# Patient Record
Sex: MALE | Race: WHITE | NOT HISPANIC OR LATINO | URBAN - METROPOLITAN AREA
[De-identification: names, ages, dates, MRNs, and addresses within clinical notes are randomized per-mention and may not be internally consistent; named-entity substitution may affect disease eponyms.]

---

## 2020-01-14 ENCOUNTER — OUTPATIENT (OUTPATIENT)
Dept: OUTPATIENT SERVICES | Facility: HOSPITAL | Age: 60
LOS: 1 days | Discharge: HOME | End: 2020-01-14

## 2020-01-14 ENCOUNTER — TRANSCRIPTION ENCOUNTER (OUTPATIENT)
Age: 60
End: 2020-01-14

## 2020-01-14 VITALS
HEIGHT: 65 IN | HEART RATE: 68 BPM | RESPIRATION RATE: 18 BRPM | TEMPERATURE: 98 F | SYSTOLIC BLOOD PRESSURE: 128 MMHG | DIASTOLIC BLOOD PRESSURE: 76 MMHG | WEIGHT: 153 LBS

## 2020-01-14 VITALS — DIASTOLIC BLOOD PRESSURE: 74 MMHG | RESPIRATION RATE: 18 BRPM | SYSTOLIC BLOOD PRESSURE: 110 MMHG | HEART RATE: 68 BPM

## 2020-01-14 DIAGNOSIS — I72.6 ANEURYSM OF VERTEBRAL ARTERY: Chronic | ICD-10-CM

## 2020-01-14 NOTE — ASU DISCHARGE PLAN (ADULT/PEDIATRIC) - FOLLOW UP APPOINTMENTS
911 or go to the nearest Emergency Room [facility]:  Endoscopy/Ambulatory Surgery [place]...

## 2020-01-14 NOTE — CHART NOTE - NSCHARTNOTEFT_GEN_A_CORE
PACU ANESTHESIA ADMISSION NOTE      Procedure:   Post op diagnosis:      ____  Intubated  TV:______       Rate: ______      FiO2: ______    __x__  Patent Airway    _x___  Full return of protective reflexes    _x___  Full recovery from anesthesia / back to baseline     Vitals:   T:           R:  12                BP:   103/73               Sat:   98                P:   76    Mental Status:  _x___ Awake   ___x__ Alert   _____ Drowsy   _____ Sedated    Nausea/Vomiting:  _x___ NO  ______Yes,   See Post - Op Orders          Pain Scale (0-10):  __x___    Treatment: ____ None    ____ See Post - Op/PCA Orders    Post - Operative Fluids:   __x__ Oral   ____ See Post - Op Orders    Plan: Discharge:   __x__Home       _____Floor     _____Critical Care    _____  Other:_________________    Comments: tolerated procedure well

## 2020-01-16 DIAGNOSIS — K80.70 CALCULUS OF GALLBLADDER AND BILE DUCT WITHOUT CHOLECYSTITIS WITHOUT OBSTRUCTION: ICD-10-CM

## 2020-01-16 DIAGNOSIS — J45.909 UNSPECIFIED ASTHMA, UNCOMPLICATED: ICD-10-CM

## 2020-01-16 DIAGNOSIS — K29.80 DUODENITIS WITHOUT BLEEDING: ICD-10-CM

## 2020-01-16 DIAGNOSIS — Z79.82 LONG TERM (CURRENT) USE OF ASPIRIN: ICD-10-CM

## 2020-01-16 DIAGNOSIS — I10 ESSENTIAL (PRIMARY) HYPERTENSION: ICD-10-CM

## 2020-01-16 DIAGNOSIS — Z91.013 ALLERGY TO SEAFOOD: ICD-10-CM

## 2022-08-19 PROBLEM — J45.909 UNSPECIFIED ASTHMA, UNCOMPLICATED: Chronic | Status: ACTIVE | Noted: 2020-01-14

## 2022-08-19 PROBLEM — I10 ESSENTIAL (PRIMARY) HYPERTENSION: Chronic | Status: ACTIVE | Noted: 2020-01-14

## 2022-08-19 PROBLEM — I63.9 CEREBRAL INFARCTION, UNSPECIFIED: Chronic | Status: ACTIVE | Noted: 2020-01-14

## 2022-12-14 ENCOUNTER — APPOINTMENT (OUTPATIENT)
Dept: CARDIOLOGY | Facility: CLINIC | Age: 62
End: 2022-12-14

## 2022-12-14 VITALS — BODY MASS INDEX: 25.51 KG/M2 | HEIGHT: 65 IN | WEIGHT: 153.13 LBS

## 2022-12-14 PROCEDURE — 99204 OFFICE O/P NEW MOD 45 MIN: CPT

## 2022-12-14 NOTE — HISTORY OF PRESENT ILLNESS
[FreeTextEntry1] : Pt with HTN, HLD, RIGHT SIDED WEAKNESS FOR 3 MONTHS  AFTER VERTEBRAL ARTERY DISSECTION 1994 S/P COVERED STENT, PREDIABETES, ASTHMA, LOW HDL 34 RANGE. \par \par 6/22: a1C: 5.9 HDL 34 \par \par pt had covid in september, pt was in [place] and says his bloodwork was better, pt has not had bloodwork since. pt not exercising as had covid. \par pt advised to start exercising but has not been \par d/w pt again about prediabetes and asthma \par pt with some sob ? due to covid

## 2022-12-14 NOTE — PHYSICAL EXAM
[Normal Venous Pressure] : normal venous pressure [Normal S1, S2] : normal S1, S2 [Clear Lung Fields] : clear lung fields [Soft] : abdomen soft [No Edema] : no edema [No Rash] : no rash [de-identified] : RRR

## 2022-12-14 NOTE — DISCUSSION/SUMMARY
[FreeTextEntry1] : diet control dm \par repeat bloodwork \par sob unlikely ischemic but risk as south  rfs \par get echo \par f/u in 4 months

## 2023-03-03 ENCOUNTER — APPOINTMENT (OUTPATIENT)
Dept: CARDIOLOGY | Facility: CLINIC | Age: 63
End: 2023-03-03

## 2023-03-21 ENCOUNTER — APPOINTMENT (OUTPATIENT)
Dept: CARDIOLOGY | Facility: CLINIC | Age: 63
End: 2023-03-21
Payer: COMMERCIAL

## 2023-03-21 VITALS — TEMPERATURE: 98.3 F | HEIGHT: 65 IN | BODY MASS INDEX: 24.54 KG/M2 | WEIGHT: 147.31 LBS

## 2023-03-21 DIAGNOSIS — R07.9 CHEST PAIN, UNSPECIFIED: ICD-10-CM

## 2023-03-21 PROCEDURE — 99214 OFFICE O/P EST MOD 30 MIN: CPT

## 2023-03-21 NOTE — PHYSICAL EXAM
[Normal Venous Pressure] : normal venous pressure [Normal S1, S2] : normal S1, S2 [Clear Lung Fields] : clear lung fields [Soft] : abdomen soft [No Edema] : no edema [No Rash] : no rash [de-identified] : RRR

## 2023-03-21 NOTE — HISTORY OF PRESENT ILLNESS
[FreeTextEntry1] : Pt with HTN, HLD, RIGHT SIDED WEAKNESS FOR 3 MONTHS  AFTER VERTEBRAL ARTERY DISSECTION 1994 S/P COVERED STENT, PREDIABETES, ASTHMA, LOW HDL 34 RANGE. \par \par 6/22: a1C: 5.9 HDL 34 \par \par pt had covid in september, pt was in [place] and says his bloodwork was better, pt has not had bloodwork since. pt not exercising as had covid. \par pt advised to start exercising but has not been \par d/w pt again about prediabetes and asthma \par pt with some sob ? due to covid \par \par 3/21/23: \par pt had a syncopal episode after using an inhaler and was out for 15 seconds. pt woke and not foggy and clear, saw pmd and sent for testing. \par echo outside center: normal lvef 55% \par carotid less than 50% b/l ica \par pt has had no prior syncopal episodes. PT SYMPTOMS WITH DISSECTION WAS FALLING TO RIGHT SIDE NO LOSS OF CONSCIOUSNESS. \par bloodwork normal \par A1c: 5.9 \par LDL 76, HDL 43.

## 2023-03-21 NOTE — DISCUSSION/SUMMARY
[FreeTextEntry1] : diet control dm \par repeat bloodwork \par sob unlikely ischemic but risk as south  rfs \par get echo \par f/u in 4 months \par \par 3/21/23\par pt syncopal etiology unknown, ? arrhythmic \par get carotid with vertebrals as not well visualized in last study, if still unable to visualize on u/s will get carotid neck. \par predm diet control.

## 2023-03-31 ENCOUNTER — APPOINTMENT (OUTPATIENT)
Dept: CARDIOLOGY | Facility: CLINIC | Age: 63
End: 2023-03-31
Payer: COMMERCIAL

## 2023-03-31 PROCEDURE — 93880 EXTRACRANIAL BILAT STUDY: CPT

## 2023-07-21 ENCOUNTER — APPOINTMENT (OUTPATIENT)
Dept: CARDIOLOGY | Facility: CLINIC | Age: 63
End: 2023-07-21

## 2023-08-29 ENCOUNTER — APPOINTMENT (OUTPATIENT)
Dept: CARDIOLOGY | Facility: CLINIC | Age: 63
End: 2023-08-29
Payer: COMMERCIAL

## 2023-08-29 VITALS — HEIGHT: 65 IN | BODY MASS INDEX: 24.57 KG/M2 | WEIGHT: 147.5 LBS

## 2023-08-29 VITALS — SYSTOLIC BLOOD PRESSURE: 130 MMHG | DIASTOLIC BLOOD PRESSURE: 80 MMHG | HEART RATE: 63 BPM

## 2023-08-29 DIAGNOSIS — Z00.00 ENCOUNTER FOR GENERAL ADULT MEDICAL EXAMINATION W/OUT ABNORMAL FINDINGS: ICD-10-CM

## 2023-08-29 PROCEDURE — 93000 ELECTROCARDIOGRAM COMPLETE: CPT

## 2023-08-29 PROCEDURE — 99214 OFFICE O/P EST MOD 30 MIN: CPT | Mod: 25

## 2023-08-29 RX ORDER — METOPROLOL SUCCINATE 25 MG/1
25 TABLET, EXTENDED RELEASE ORAL
Refills: 0 | Status: ACTIVE | COMMUNITY

## 2023-08-29 NOTE — HISTORY OF PRESENT ILLNESS
[FreeTextEntry1] : Pt with HTN, HLD, RIGHT SIDED WEAKNESS FOR 3 MONTHS  AFTER VERTEBRAL ARTERY DISSECTION  S/P COVERED STENT, PREDIABETES, ASTHMA, LOW HDL 34 RANGE, SYNCOPE IN .   : a1C: 5.9 HDL 34   3/21/23:  pt had a syncopal episode after using an inhaler and was out for 15 seconds. pt woke and not foggy and clear, saw pmd and sent for testing.  echo outside center: normal lvef 55%  carotid less than 50% b/l ica  pt has had no prior syncopal episodes. PT SYMPTOMS WITH DISSECTION WAS FALLING TO RIGHT SIDE NO LOSS OF CONSCIOUSNESS.  bloodwork normal  A1c: 5.9  LDL 76, HDL 43.   23: A1C: 5.9, T, HDL: 41, LDL: 74, CR: 1.3 3/31/23: B/L CAROTIDS LESS THAN 50% B/L ICA, vertebral arteries b/l antegrade flow. Patient c/o left sided head pain hx of vertebral artery dissection. Patient denies Patient denies cp, sob, dizziness, syncope, or palpitations. Patient does not exercise says he walks a few blocks and does not feel sob. Patient was in [place] from [place]- and did not have any issues.

## 2023-08-29 NOTE — PHYSICAL EXAM
[Normal Venous Pressure] : normal venous pressure [Normal S1, S2] : normal S1, S2 [Clear Lung Fields] : clear lung fields [Soft] : abdomen soft [No Edema] : no edema [No Rash] : no rash [de-identified] : RRR

## 2023-08-29 NOTE — DISCUSSION/SUMMARY
[FreeTextEntry1] : 8/29/23  pt syncopal etiology unknown, ? arrhythmic  get CTA head and neck carotid with vertebrals as not well visualized in last study, if still unable to visualize on u/s will get carotid neck.  predm diet control. Get 2d echo  get blood work  continue atorvastatin continue metoprolol  [EKG obtained to assist in diagnosis and management of assessed problem(s)] : EKG obtained to assist in diagnosis and management of assessed problem(s)

## 2023-09-23 ENCOUNTER — INPATIENT (INPATIENT)
Facility: HOSPITAL | Age: 63
LOS: 3 days | Discharge: HOME | DRG: 419 | End: 2023-09-27
Attending: SURGERY | Admitting: STUDENT IN AN ORGANIZED HEALTH CARE EDUCATION/TRAINING PROGRAM
Payer: COMMERCIAL

## 2023-09-23 VITALS
WEIGHT: 149.91 LBS | HEART RATE: 75 BPM | DIASTOLIC BLOOD PRESSURE: 94 MMHG | RESPIRATION RATE: 18 BRPM | OXYGEN SATURATION: 99 % | TEMPERATURE: 98 F | SYSTOLIC BLOOD PRESSURE: 166 MMHG | HEIGHT: 65 IN

## 2023-09-23 DIAGNOSIS — R33.9 RETENTION OF URINE, UNSPECIFIED: ICD-10-CM

## 2023-09-23 DIAGNOSIS — Z86.73 PERSONAL HISTORY OF TRANSIENT ISCHEMIC ATTACK (TIA), AND CEREBRAL INFARCTION WITHOUT RESIDUAL DEFICITS: ICD-10-CM

## 2023-09-23 DIAGNOSIS — E78.5 HYPERLIPIDEMIA, UNSPECIFIED: ICD-10-CM

## 2023-09-23 DIAGNOSIS — I10 ESSENTIAL (PRIMARY) HYPERTENSION: ICD-10-CM

## 2023-09-23 DIAGNOSIS — E87.5 HYPERKALEMIA: ICD-10-CM

## 2023-09-23 DIAGNOSIS — I72.6 ANEURYSM OF VERTEBRAL ARTERY: Chronic | ICD-10-CM

## 2023-09-23 DIAGNOSIS — Z79.02 LONG TERM (CURRENT) USE OF ANTITHROMBOTICS/ANTIPLATELETS: ICD-10-CM

## 2023-09-23 DIAGNOSIS — K80.42 CALCULUS OF BILE DUCT WITH ACUTE CHOLECYSTITIS WITHOUT OBSTRUCTION: ICD-10-CM

## 2023-09-23 DIAGNOSIS — Z91.013 ALLERGY TO SEAFOOD: ICD-10-CM

## 2023-09-23 DIAGNOSIS — Z79.82 LONG TERM (CURRENT) USE OF ASPIRIN: ICD-10-CM

## 2023-09-23 DIAGNOSIS — J45.909 UNSPECIFIED ASTHMA, UNCOMPLICATED: ICD-10-CM

## 2023-09-23 DIAGNOSIS — Q27.8 OTHER SPECIFIED CONGENITAL MALFORMATIONS OF PERIPHERAL VASCULAR SYSTEM: ICD-10-CM

## 2023-09-23 LAB
BASOPHILS # BLD AUTO: 0.01 K/UL — SIGNIFICANT CHANGE UP (ref 0–0.2)
BASOPHILS NFR BLD AUTO: 0.1 % — SIGNIFICANT CHANGE UP (ref 0–1)
EOSINOPHIL # BLD AUTO: 0.12 K/UL — SIGNIFICANT CHANGE UP (ref 0–0.7)
EOSINOPHIL NFR BLD AUTO: 1.4 % — SIGNIFICANT CHANGE UP (ref 0–8)
HCT VFR BLD CALC: 43.7 % — SIGNIFICANT CHANGE UP (ref 42–52)
HGB BLD-MCNC: 14.8 G/DL — SIGNIFICANT CHANGE UP (ref 14–18)
IMM GRANULOCYTES NFR BLD AUTO: 0.2 % — SIGNIFICANT CHANGE UP (ref 0.1–0.3)
LACTATE SERPL-SCNC: 1.8 MMOL/L — SIGNIFICANT CHANGE UP (ref 0.7–2)
LYMPHOCYTES # BLD AUTO: 1.32 K/UL — SIGNIFICANT CHANGE UP (ref 1.2–3.4)
LYMPHOCYTES # BLD AUTO: 15.1 % — LOW (ref 20.5–51.1)
MCHC RBC-ENTMCNC: 29.5 PG — SIGNIFICANT CHANGE UP (ref 27–31)
MCHC RBC-ENTMCNC: 33.9 G/DL — SIGNIFICANT CHANGE UP (ref 32–37)
MCV RBC AUTO: 87.2 FL — SIGNIFICANT CHANGE UP (ref 80–94)
MONOCYTES # BLD AUTO: 0.6 K/UL — SIGNIFICANT CHANGE UP (ref 0.1–0.6)
MONOCYTES NFR BLD AUTO: 6.8 % — SIGNIFICANT CHANGE UP (ref 1.7–9.3)
NEUTROPHILS # BLD AUTO: 6.69 K/UL — HIGH (ref 1.4–6.5)
NEUTROPHILS NFR BLD AUTO: 76.4 % — HIGH (ref 42.2–75.2)
NRBC # BLD: 0 /100 WBCS — SIGNIFICANT CHANGE UP (ref 0–0)
PLATELET # BLD AUTO: 286 K/UL — SIGNIFICANT CHANGE UP (ref 130–400)
PMV BLD: 9 FL — SIGNIFICANT CHANGE UP (ref 7.4–10.4)
RBC # BLD: 5.01 M/UL — SIGNIFICANT CHANGE UP (ref 4.7–6.1)
RBC # FLD: 12.5 % — SIGNIFICANT CHANGE UP (ref 11.5–14.5)
WBC # BLD: 8.76 K/UL — SIGNIFICANT CHANGE UP (ref 4.8–10.8)
WBC # FLD AUTO: 8.76 K/UL — SIGNIFICANT CHANGE UP (ref 4.8–10.8)

## 2023-09-23 PROCEDURE — 99285 EMERGENCY DEPT VISIT HI MDM: CPT

## 2023-09-23 RX ORDER — FAMOTIDINE 10 MG/ML
20 INJECTION INTRAVENOUS ONCE
Refills: 0 | Status: COMPLETED | OUTPATIENT
Start: 2023-09-23 | End: 2023-09-23

## 2023-09-23 RX ORDER — SODIUM CHLORIDE 9 MG/ML
1000 INJECTION INTRAMUSCULAR; INTRAVENOUS; SUBCUTANEOUS ONCE
Refills: 0 | Status: COMPLETED | OUTPATIENT
Start: 2023-09-23 | End: 2023-09-23

## 2023-09-23 RX ORDER — ONDANSETRON 8 MG/1
4 TABLET, FILM COATED ORAL ONCE
Refills: 0 | Status: COMPLETED | OUTPATIENT
Start: 2023-09-23 | End: 2023-09-23

## 2023-09-23 RX ADMIN — ONDANSETRON 4 MILLIGRAM(S): 8 TABLET, FILM COATED ORAL at 23:29

## 2023-09-23 RX ADMIN — FAMOTIDINE 20 MILLIGRAM(S): 10 INJECTION INTRAVENOUS at 23:30

## 2023-09-23 RX ADMIN — SODIUM CHLORIDE 1000 MILLILITER(S): 9 INJECTION INTRAMUSCULAR; INTRAVENOUS; SUBCUTANEOUS at 23:34

## 2023-09-23 NOTE — ED ADULT TRIAGE NOTE - CHIEF COMPLAINT QUOTE
PT c/o generalized abd pain, states burning pain.  pt c/o nausea and gas,  denies vomiting or diarrhea

## 2023-09-24 DIAGNOSIS — R10.9 UNSPECIFIED ABDOMINAL PAIN: ICD-10-CM

## 2023-09-24 LAB
ALBUMIN SERPL ELPH-MCNC: 5.1 G/DL — SIGNIFICANT CHANGE UP (ref 3.5–5.2)
ALBUMIN SERPL ELPH-MCNC: 5.2 G/DL — SIGNIFICANT CHANGE UP (ref 3.5–5.2)
ALP SERPL-CCNC: 76 U/L — SIGNIFICANT CHANGE UP (ref 30–115)
ALP SERPL-CCNC: 87 U/L — SIGNIFICANT CHANGE UP (ref 30–115)
ALT FLD-CCNC: 218 U/L — HIGH (ref 0–41)
ALT FLD-CCNC: 289 U/L — HIGH (ref 0–41)
ANION GAP SERPL CALC-SCNC: 11 MMOL/L — SIGNIFICANT CHANGE UP (ref 7–14)
ANION GAP SERPL CALC-SCNC: 14 MMOL/L — SIGNIFICANT CHANGE UP (ref 7–14)
APPEARANCE UR: CLEAR — SIGNIFICANT CHANGE UP
AST SERPL-CCNC: 225 U/L — HIGH (ref 0–41)
AST SERPL-CCNC: 312 U/L — HIGH (ref 0–41)
BACTERIA # UR AUTO: NEGATIVE /HPF — SIGNIFICANT CHANGE UP
BASOPHILS # BLD AUTO: 0 K/UL — SIGNIFICANT CHANGE UP (ref 0–0.2)
BASOPHILS NFR BLD AUTO: 0 % — SIGNIFICANT CHANGE UP (ref 0–1)
BILIRUB SERPL-MCNC: 0.6 MG/DL — SIGNIFICANT CHANGE UP (ref 0.2–1.2)
BILIRUB SERPL-MCNC: 1.1 MG/DL — SIGNIFICANT CHANGE UP (ref 0.2–1.2)
BILIRUB UR-MCNC: NEGATIVE — SIGNIFICANT CHANGE UP
BUN SERPL-MCNC: 13 MG/DL — SIGNIFICANT CHANGE UP (ref 10–20)
BUN SERPL-MCNC: 14 MG/DL — SIGNIFICANT CHANGE UP (ref 10–20)
CALCIUM SERPL-MCNC: 9.4 MG/DL — SIGNIFICANT CHANGE UP (ref 8.4–10.5)
CALCIUM SERPL-MCNC: 9.9 MG/DL — SIGNIFICANT CHANGE UP (ref 8.4–10.5)
CAST: 0 /LPF — SIGNIFICANT CHANGE UP (ref 0–4)
CHLORIDE SERPL-SCNC: 100 MMOL/L — SIGNIFICANT CHANGE UP (ref 98–110)
CHLORIDE SERPL-SCNC: 96 MMOL/L — LOW (ref 98–110)
CO2 SERPL-SCNC: 26 MMOL/L — SIGNIFICANT CHANGE UP (ref 17–32)
CO2 SERPL-SCNC: 27 MMOL/L — SIGNIFICANT CHANGE UP (ref 17–32)
COLOR SPEC: YELLOW — SIGNIFICANT CHANGE UP
CREAT SERPL-MCNC: 1.1 MG/DL — SIGNIFICANT CHANGE UP (ref 0.7–1.5)
CREAT SERPL-MCNC: 1.1 MG/DL — SIGNIFICANT CHANGE UP (ref 0.7–1.5)
DIFF PNL FLD: ABNORMAL
EGFR: 75 ML/MIN/1.73M2 — SIGNIFICANT CHANGE UP
EGFR: 75 ML/MIN/1.73M2 — SIGNIFICANT CHANGE UP
EOSINOPHIL # BLD AUTO: 0 K/UL — SIGNIFICANT CHANGE UP (ref 0–0.7)
EOSINOPHIL NFR BLD AUTO: 0 % — SIGNIFICANT CHANGE UP (ref 0–8)
GLUCOSE SERPL-MCNC: 149 MG/DL — HIGH (ref 70–99)
GLUCOSE SERPL-MCNC: 211 MG/DL — HIGH (ref 70–99)
GLUCOSE UR QL: NEGATIVE MG/DL — SIGNIFICANT CHANGE UP
HCT VFR BLD CALC: 48.4 % — SIGNIFICANT CHANGE UP (ref 42–52)
HGB BLD-MCNC: 16 G/DL — SIGNIFICANT CHANGE UP (ref 14–18)
IMM GRANULOCYTES NFR BLD AUTO: 0.3 % — SIGNIFICANT CHANGE UP (ref 0.1–0.3)
KETONES UR-MCNC: NEGATIVE MG/DL — SIGNIFICANT CHANGE UP
LEUKOCYTE ESTERASE UR-ACNC: NEGATIVE — SIGNIFICANT CHANGE UP
LIDOCAIN IGE QN: 79 U/L — HIGH (ref 7–60)
LYMPHOCYTES # BLD AUTO: 0.96 K/UL — LOW (ref 1.2–3.4)
LYMPHOCYTES # BLD AUTO: 15.5 % — LOW (ref 20.5–51.1)
MAGNESIUM SERPL-MCNC: 2.4 MG/DL — SIGNIFICANT CHANGE UP (ref 1.8–2.4)
MCHC RBC-ENTMCNC: 29.7 PG — SIGNIFICANT CHANGE UP (ref 27–31)
MCHC RBC-ENTMCNC: 33.1 G/DL — SIGNIFICANT CHANGE UP (ref 32–37)
MCV RBC AUTO: 89.8 FL — SIGNIFICANT CHANGE UP (ref 80–94)
MONOCYTES # BLD AUTO: 0.05 K/UL — LOW (ref 0.1–0.6)
MONOCYTES NFR BLD AUTO: 0.8 % — LOW (ref 1.7–9.3)
NEUTROPHILS # BLD AUTO: 5.15 K/UL — SIGNIFICANT CHANGE UP (ref 1.4–6.5)
NEUTROPHILS NFR BLD AUTO: 83.4 % — HIGH (ref 42.2–75.2)
NITRITE UR-MCNC: NEGATIVE — SIGNIFICANT CHANGE UP
NRBC # BLD: 0 /100 WBCS — SIGNIFICANT CHANGE UP (ref 0–0)
PH UR: 8 — SIGNIFICANT CHANGE UP (ref 5–8)
PLATELET # BLD AUTO: 307 K/UL — SIGNIFICANT CHANGE UP (ref 130–400)
PMV BLD: 9 FL — SIGNIFICANT CHANGE UP (ref 7.4–10.4)
POTASSIUM SERPL-MCNC: 4.9 MMOL/L — SIGNIFICANT CHANGE UP (ref 3.5–5)
POTASSIUM SERPL-MCNC: 5.5 MMOL/L — HIGH (ref 3.5–5)
POTASSIUM SERPL-SCNC: 4.9 MMOL/L — SIGNIFICANT CHANGE UP (ref 3.5–5)
POTASSIUM SERPL-SCNC: 5.5 MMOL/L — HIGH (ref 3.5–5)
PROT SERPL-MCNC: 8 G/DL — SIGNIFICANT CHANGE UP (ref 6–8)
PROT SERPL-MCNC: 8.1 G/DL — HIGH (ref 6–8)
PROT UR-MCNC: NEGATIVE MG/DL — SIGNIFICANT CHANGE UP
RBC # BLD: 5.39 M/UL — SIGNIFICANT CHANGE UP (ref 4.7–6.1)
RBC # FLD: 12.5 % — SIGNIFICANT CHANGE UP (ref 11.5–14.5)
RBC CASTS # UR COMP ASSIST: 5 /HPF — HIGH (ref 0–4)
SODIUM SERPL-SCNC: 134 MMOL/L — LOW (ref 135–146)
SODIUM SERPL-SCNC: 140 MMOL/L — SIGNIFICANT CHANGE UP (ref 135–146)
SP GR SPEC: 1 — SIGNIFICANT CHANGE UP (ref 1–1.03)
SQUAMOUS # UR AUTO: 0 /HPF — SIGNIFICANT CHANGE UP (ref 0–5)
TROPONIN T SERPL-MCNC: <0.01 NG/ML — SIGNIFICANT CHANGE UP
UROBILINOGEN FLD QL: 0.2 MG/DL — SIGNIFICANT CHANGE UP (ref 0.2–1)
WBC # BLD: 6.18 K/UL — SIGNIFICANT CHANGE UP (ref 4.8–10.8)
WBC # FLD AUTO: 6.18 K/UL — SIGNIFICANT CHANGE UP (ref 4.8–10.8)
WBC UR QL: 0 /HPF — SIGNIFICANT CHANGE UP (ref 0–5)

## 2023-09-24 PROCEDURE — 86803 HEPATITIS C AB TEST: CPT

## 2023-09-24 PROCEDURE — 85730 THROMBOPLASTIN TIME PARTIAL: CPT

## 2023-09-24 PROCEDURE — 74174 CTA ABD&PLVS W/CONTRAST: CPT | Mod: 26,MA

## 2023-09-24 PROCEDURE — 88304 TISSUE EXAM BY PATHOLOGIST: CPT

## 2023-09-24 PROCEDURE — 86850 RBC ANTIBODY SCREEN: CPT

## 2023-09-24 PROCEDURE — 76705 ECHO EXAM OF ABDOMEN: CPT | Mod: 26

## 2023-09-24 PROCEDURE — 74018 RADEX ABDOMEN 1 VIEW: CPT

## 2023-09-24 PROCEDURE — 80076 HEPATIC FUNCTION PANEL: CPT

## 2023-09-24 PROCEDURE — 80053 COMPREHEN METABOLIC PANEL: CPT

## 2023-09-24 PROCEDURE — 85610 PROTHROMBIN TIME: CPT

## 2023-09-24 PROCEDURE — 86901 BLOOD TYPING SEROLOGIC RH(D): CPT

## 2023-09-24 PROCEDURE — 86900 BLOOD TYPING SEROLOGIC ABO: CPT

## 2023-09-24 PROCEDURE — 83735 ASSAY OF MAGNESIUM: CPT

## 2023-09-24 PROCEDURE — 99223 1ST HOSP IP/OBS HIGH 75: CPT

## 2023-09-24 PROCEDURE — 71045 X-RAY EXAM CHEST 1 VIEW: CPT | Mod: 26

## 2023-09-24 PROCEDURE — 85025 COMPLETE CBC W/AUTO DIFF WBC: CPT

## 2023-09-24 PROCEDURE — 85027 COMPLETE CBC AUTOMATED: CPT

## 2023-09-24 PROCEDURE — 36415 COLL VENOUS BLD VENIPUNCTURE: CPT

## 2023-09-24 PROCEDURE — 71275 CT ANGIOGRAPHY CHEST: CPT | Mod: 26,MA

## 2023-09-24 PROCEDURE — S2900: CPT

## 2023-09-24 PROCEDURE — 93010 ELECTROCARDIOGRAM REPORT: CPT

## 2023-09-24 PROCEDURE — 80048 BASIC METABOLIC PNL TOTAL CA: CPT

## 2023-09-24 PROCEDURE — C1773: CPT

## 2023-09-24 PROCEDURE — C1889: CPT

## 2023-09-24 PROCEDURE — 70450 CT HEAD/BRAIN W/O DYE: CPT | Mod: 26,MA

## 2023-09-24 PROCEDURE — C1769: CPT

## 2023-09-24 PROCEDURE — 84100 ASSAY OF PHOSPHORUS: CPT

## 2023-09-24 RX ORDER — METOPROLOL TARTRATE 50 MG
25 TABLET ORAL DAILY
Refills: 0 | Status: DISCONTINUED | OUTPATIENT
Start: 2023-09-24 | End: 2023-09-26

## 2023-09-24 RX ORDER — ONDANSETRON 8 MG/1
4 TABLET, FILM COATED ORAL EVERY 8 HOURS
Refills: 0 | Status: DISCONTINUED | OUTPATIENT
Start: 2023-09-24 | End: 2023-09-26

## 2023-09-24 RX ORDER — SENNA PLUS 8.6 MG/1
2 TABLET ORAL AT BEDTIME
Refills: 0 | Status: DISCONTINUED | OUTPATIENT
Start: 2023-09-24 | End: 2023-09-26

## 2023-09-24 RX ORDER — FLUTICASONE FUROATE AND VILANTEROL TRIFENATATE 100; 25 UG/1; UG/1
1 POWDER RESPIRATORY (INHALATION)
Refills: 0 | DISCHARGE

## 2023-09-24 RX ORDER — ASPIRIN/CALCIUM CARB/MAGNESIUM 324 MG
1 TABLET ORAL
Qty: 0 | Refills: 0 | DISCHARGE

## 2023-09-24 RX ORDER — ENOXAPARIN SODIUM 100 MG/ML
40 INJECTION SUBCUTANEOUS EVERY 24 HOURS
Refills: 0 | Status: DISCONTINUED | OUTPATIENT
Start: 2023-09-24 | End: 2023-09-25

## 2023-09-24 RX ORDER — ATORVASTATIN CALCIUM 80 MG/1
10 TABLET, FILM COATED ORAL AT BEDTIME
Refills: 0 | Status: DISCONTINUED | OUTPATIENT
Start: 2023-09-24 | End: 2023-09-26

## 2023-09-24 RX ORDER — MORPHINE SULFATE 50 MG/1
2 CAPSULE, EXTENDED RELEASE ORAL EVERY 6 HOURS
Refills: 0 | Status: DISCONTINUED | OUTPATIENT
Start: 2023-09-24 | End: 2023-09-26

## 2023-09-24 RX ORDER — DIPHENHYDRAMINE HCL 50 MG
25 CAPSULE ORAL ONCE
Refills: 0 | Status: COMPLETED | OUTPATIENT
Start: 2023-09-24 | End: 2023-09-24

## 2023-09-24 RX ORDER — METOPROLOL TARTRATE 50 MG
1 TABLET ORAL
Qty: 0 | Refills: 0 | DISCHARGE

## 2023-09-24 RX ORDER — SODIUM ZIRCONIUM CYCLOSILICATE 10 G/10G
5 POWDER, FOR SUSPENSION ORAL ONCE
Refills: 0 | Status: COMPLETED | OUTPATIENT
Start: 2023-09-24 | End: 2023-09-24

## 2023-09-24 RX ORDER — ALBUTEROL 90 UG/1
2 AEROSOL, METERED ORAL
Qty: 0 | Refills: 0 | DISCHARGE

## 2023-09-24 RX ORDER — LANOLIN ALCOHOL/MO/W.PET/CERES
3 CREAM (GRAM) TOPICAL AT BEDTIME
Refills: 0 | Status: DISCONTINUED | OUTPATIENT
Start: 2023-09-24 | End: 2023-09-26

## 2023-09-24 RX ORDER — POLYETHYLENE GLYCOL 3350 17 G/17G
17 POWDER, FOR SOLUTION ORAL DAILY
Refills: 0 | Status: DISCONTINUED | OUTPATIENT
Start: 2023-09-24 | End: 2023-09-26

## 2023-09-24 RX ORDER — METOPROLOL TARTRATE 50 MG
1 TABLET ORAL
Refills: 0 | DISCHARGE

## 2023-09-24 RX ORDER — ASPIRIN/CALCIUM CARB/MAGNESIUM 324 MG
81 TABLET ORAL DAILY
Refills: 0 | Status: DISCONTINUED | OUTPATIENT
Start: 2023-09-24 | End: 2023-09-26

## 2023-09-24 RX ORDER — ATORVASTATIN CALCIUM 80 MG/1
1 TABLET, FILM COATED ORAL
Qty: 0 | Refills: 0 | DISCHARGE

## 2023-09-24 RX ORDER — ALBUTEROL 90 UG/1
2 AEROSOL, METERED ORAL EVERY 6 HOURS
Refills: 0 | Status: DISCONTINUED | OUTPATIENT
Start: 2023-09-24 | End: 2023-09-26

## 2023-09-24 RX ORDER — MORPHINE SULFATE 50 MG/1
4 CAPSULE, EXTENDED RELEASE ORAL ONCE
Refills: 0 | Status: DISCONTINUED | OUTPATIENT
Start: 2023-09-24 | End: 2023-09-24

## 2023-09-24 RX ORDER — INFLUENZA VIRUS VACCINE 15; 15; 15; 15 UG/.5ML; UG/.5ML; UG/.5ML; UG/.5ML
0.5 SUSPENSION INTRAMUSCULAR ONCE
Refills: 0 | Status: DISCONTINUED | OUTPATIENT
Start: 2023-09-24 | End: 2023-09-27

## 2023-09-24 RX ORDER — IPRATROPIUM/ALBUTEROL SULFATE 18-103MCG
3 AEROSOL WITH ADAPTER (GRAM) INHALATION EVERY 6 HOURS
Refills: 0 | Status: DISCONTINUED | OUTPATIENT
Start: 2023-09-24 | End: 2023-09-26

## 2023-09-24 RX ADMIN — Medication 25 MILLIGRAM(S): at 01:00

## 2023-09-24 RX ADMIN — Medication 125 MILLIGRAM(S): at 01:00

## 2023-09-24 RX ADMIN — SENNA PLUS 2 TABLET(S): 8.6 TABLET ORAL at 21:02

## 2023-09-24 RX ADMIN — POLYETHYLENE GLYCOL 3350 17 GRAM(S): 17 POWDER, FOR SOLUTION ORAL at 12:10

## 2023-09-24 RX ADMIN — SODIUM ZIRCONIUM CYCLOSILICATE 5 GRAM(S): 10 POWDER, FOR SUSPENSION ORAL at 17:25

## 2023-09-24 RX ADMIN — Medication 81 MILLIGRAM(S): at 12:05

## 2023-09-24 RX ADMIN — Medication 25 MILLIGRAM(S): at 12:05

## 2023-09-24 RX ADMIN — ENOXAPARIN SODIUM 40 MILLIGRAM(S): 100 INJECTION SUBCUTANEOUS at 21:02

## 2023-09-24 RX ADMIN — ATORVASTATIN CALCIUM 10 MILLIGRAM(S): 80 TABLET, FILM COATED ORAL at 21:01

## 2023-09-24 NOTE — ED PROVIDER NOTE - DIFFERENTIAL DIAGNOSIS
Differential Diagnosis Pancreatitis, hepatic failure, obstructive uropathy, diverticulitis, colitis, abscess, bowel obstruction, bowel perforation. Electrolyte abnormalities, MARY, and GI bleeding.  r/o ICH.

## 2023-09-24 NOTE — H&P ADULT - NSHPPHYSICALEXAM_GEN_ALL_CORE
T(C): 36.3 (09-24-23 @ 08:42), Max: 36.7 (09-23-23 @ 22:14)  HR: 60 (09-24-23 @ 08:42) (58 - 75)  BP: 124/78 (09-24-23 @ 08:42) (124/78 - 166/94)  RR: 18 (09-24-23 @ 08:42) (18 - 18)  SpO2: 96% (09-24-23 @ 08:42) (96% - 99%)    CONSTITUTIONAL: NAD, laying in bed comfortably  HEENT: AT, NC. neck supple, no JVD  RESP: No respiratory distress, no use of accessory muscles; CTAB  CV: RRR, +S1S2, no peripheral edema  GI: Soft, NT, ND, no rebound, no guarding; negative murphys  MSK: Normal and equal muscle strength/tone in b/l UE and LE  SKIN: No rashes or ulcers noted  NEURO: Sensation intact in upper and lower extremities b/l to light touch   PSYCH: AAOx3

## 2023-09-24 NOTE — ED PROVIDER NOTE - PHYSICAL EXAMINATION
CONST: Well appearing in NAD  EYES: PERRL, EOMI, Sclera and conjunctiva clear.   ENT: Oropharynx normal appearing, no erythema or exudates. Uvula midline.  CARD: Normal S1 S2; Normal rate and rhythm  RESP: Equal BS B/L, No wheezes, rhonchi or rales. No distress  GI: RUQ TTP palpation. No rebound or guarding. Abd soft, non distended.   SKIN: Warm, dry, no acute rashes. Good turgor  NEURO: A&Ox3, No focal deficits. Strength 5/5 with no sensory deficits.

## 2023-09-24 NOTE — H&P ADULT - ATTENDING COMMENTS
Patient was seen independently. Latest vital signs and labs  & imaging, EKG etc were reviewed. Case was discussed with house staff. Agree with resident's assessment and plan with following additions/modifications.     Patient denies any headache, any vision complaints, runny nose, fever, chills, sore throat. Denies chest pain,, palpitation. Denies , abdominal pain, diarrhoea, Denies urinary burning, urgency, frequency, dysuria. At least 10 systems were reviewed in ROS. All systems reviewed  are within normal limits except for the complaints as described in Subjective.         Not in acute distress  General: No pallor, no icterus, afebrile  HEENT: No JVD, no Bruit.  Heart: S1+S2 audible  Lungs: bilateral  fair air entry, no wheezing, no crepitations.  Abdomen: Soft, non-tender, not distended , no  rigidity or guarding.  CNS: Grossly intact, sensations intact.  Extremities:  No edema        ASSESSMENT & PLAN:  Pt is a 64 y/o M PMHx of HTN, asthma, hx R vertebral artery dissection presenting to the ED with abdominal pain. Admitted to medicine      Biliary colic   Choledocholithiasis  Transaminits - trend LFTS  Reports prior h/o ERCP 3 yrs ago when GI was not able to remove stone but symptoms improved  Keep NPO after midnight . On full liquid for now  Possible need for ERCp tomorrow vs MRCP if LFts and pain resolved  Advance GI consult  Hx R vertebral artery dissection- C/w home medication Aspirin/Lipitor, metoprolol  Stable Asthma- C/w home inhalers (Breo and Albuterol PRN)      Expected inpatient care exceeds 48 hrs.

## 2023-09-24 NOTE — PATIENT PROFILE ADULT - FALL HARM RISK - UNIVERSAL INTERVENTIONS
Bed in lowest position, wheels locked, appropriate side rails in place/Call bell, personal items and telephone in reach/Instruct patient to call for assistance before getting out of bed or chair/Non-slip footwear when patient is out of bed/[place] to call system/Physically safe environment - no spills, clutter or unnecessary equipment/Purposeful Proactive Rounding/Room/bathroom lighting operational, light cord in reach

## 2023-09-24 NOTE — CONSULT NOTE ADULT - SUBJECTIVE AND OBJECTIVE BOX
Gastroenterology Consultation:    Patient is a 63y old  Male who presents with a chief complaint of Abdominal pain (24 Sep 2023 08:13)        Admitted on: 09-24-23      HPI:  Pt is a 64 y/o M PMHx of HTN, asthma, hx R vertebral artery dissection presenting to the ED with abdominal pain. Pt reports yesterday he had severe sharp RUQ abdominal pain rated 9/10 in severity. Pain not associated with food intake and had an insidious onset. He preciously had minor aches on/off, but never this severe. On my examination pain 4/10, achy, and radiates from RUQ to back. Pt denies nausea, vomiting, chest pain, weakness, and numbness.    In ED vitals: /94 --> 136/81, HR 75, RR 18, Sat 98% on RA    Labs significant for: , , Lipase 79, Lactate 1.8, Trop <0.01, UA -    CT Angio Chest/Abd/Pelvis    1. Mild dilation of common bile duct up to 0.7 cm with distal CBD 0.4 cm choledocholith; correlation with LFTs suggested.    2. Cholelithiasis.    3. No evidence of acute aortic pathology.      RUQ US    Distended gallbladder with cholelithiasis. No abnormal gallbladder wall thickening. Negative reported sonographic Horn sign.  Extra hepatic biliary ducts better evaluated on recent CT.    Admitted to medicine (24 Sep 2023 08:13)        Prior EGD:    Prior Colonoscopy:      PAST MEDICAL & SURGICAL HISTORY:  Stroke      HTN (hypertension)      Asthma      Aneurysm of vertebral artery            FAMILY HISTORY:      Social History:  Tobacco:  Alcohol:  Drugs:    Home Medications:  aspirin 81 mg oral delayed release tablet: 1 tab(s) orally once a day (24 Sep 2023 08:13)  Breo Ellipta 100 mcg-25 mcg/inh inhalation powder: 1 puff(s) inhaled once a day (24 Sep 2023 08:12)  Lipitor 10 mg oral tablet: 1 tab(s) orally once a day (24 Sep 2023 08:13)  metoprolol succinate 25 mg oral tablet, extended release: 1 tab(s) orally once a day (24 Sep 2023 08:12)  ProAir HFA 90 mcg/inh inhalation aerosol: 2 puff(s) inhaled 4 times a day, As Needed (24 Sep 2023 08:13)        MEDICATIONS  (STANDING):  aspirin enteric coated 81 milliGRAM(s) Oral daily  atorvastatin 10 milliGRAM(s) Oral at bedtime  enoxaparin Injectable 40 milliGRAM(s) SubCutaneous every 24 hours  influenza   Vaccine 0.5 milliLiter(s) IntraMuscular once  metoprolol succinate ER 25 milliGRAM(s) Oral daily  polyethylene glycol 3350 17 Gram(s) Oral daily  senna 2 Tablet(s) Oral at bedtime  sodium zirconium cyclosilicate 5 Gram(s) Oral once    MEDICATIONS  (PRN):  albuterol    90 MICROgram(s) HFA Inhaler 2 Puff(s) Inhalation every 6 hours PRN Shortness of Breath and/or Wheezing  albuterol/ipratropium for Nebulization 3 milliLiter(s) Nebulizer every 6 hours PRN Shortness of Breath and/or Wheezing  aluminum hydroxide/magnesium hydroxide/simethicone Suspension 30 milliLiter(s) Oral every 4 hours PRN Dyspepsia  melatonin 3 milliGRAM(s) Oral at bedtime PRN Insomnia  morphine  - Injectable 2 milliGRAM(s) IV Push every 6 hours PRN Pain 4-10  ondansetron Injectable 4 milliGRAM(s) IV Push every 8 hours PRN Nausea and/or Vomiting      Allergies  Seafood (Urticaria)  No Known Drug Allergies      Review of Systems:   Constitutional:  No Fever, No Chills  ENT/Mouth:  No Hearing Changes,  No Difficulty Swallowing  Eyes:  No Eye Pain, No Vision Changes  Cardiovascular:  No Chest Pain, No Palpitations  Respiratory:  No Cough, No Dyspnea  Gastrointestinal:  As described in HPI  Musculoskeletal:  No Joint Swelling, No Back Pain  Skin:  No Skin Lesions, No Jaundice  Neuro:  No Syncope, No Dizziness  Heme/Lymph:  No Bruising, No Bleeding.          Physical Examination:  T(C): 36.6 (09-24-23 @ 12:24), Max: 36.7 (09-23-23 @ 22:14)  HR: 72 (09-24-23 @ 12:24) (58 - 75)  BP: 121/78 (09-24-23 @ 12:24) (121/78 - 166/94)  RR: 18 (09-24-23 @ 12:24) (18 - 18)  SpO2: 97% (09-24-23 @ 12:24) (96% - 99%)  Height (cm): 165.1 (09-23-23 @ 22:14)  Weight (kg): 68 (09-23-23 @ 22:14)    09-24-23 @ 07:01  -  09-24-23 @ 14:00  --------------------------------------------------------  IN: 240 mL / OUT: 0 mL / NET: 240 mL          GENERAL: AAOx3, no acute distress.  HEAD:  Atraumatic, Normocephalic  EYES: conjunctiva and sclera clear  NECK: Supple, no JVD or thyromegaly  CHEST/LUNG: Clear to auscultation bilaterally; No wheeze, rhonchi, or rales  HEART: Regular rate and rhythm; normal S1, S2, No murmurs.  ABDOMEN: Soft, nontender, nondistended; Bowel sounds present  NEUROLOGY: No asterixis or tremor.   SKIN: Intact, no jaundice        Data:                        16.0   6.18  )-----------( 307      ( 24 Sep 2023 11:14 )             48.4     Hgb Trend:  16.0  09-24-23 @ 11:14  14.8  09-23-23 @ 23:27        09-24    140  |  100  |  13  ----------------------------<  211<H>  5.5<H>   |  26  |  1.1    Ca    9.9      24 Sep 2023 11:14  Mg     2.4     09-24    TPro  8.1<H>  /  Alb  5.2  /  TBili  0.6  /  DBili  x   /  AST  225<H>  /  ALT  289<H>  /  AlkPhos  87  09-24    Liver panel trend:  TBili 0.6   /      /      /   AlkP 87   /   Tptn 8.1   /   Alb 5.2    /   DBili --      09-24  TBili 1.1   /      /      /   AlkP 76   /   Tptn 8.0   /   Alb 5.1    /   DBili --      09-23              Radiology:  CT Angio Abdomen and Pelvis w/ IV Cont:   ACC: [id number] EXAM:  CT ANGIO ABD PELV (W)AW IC   ORDERED BY: LUDIVINA ELIZALDE     ACC: [id number] EXAM:  CT ANGIO CHEST AORTA [facility]   ORDERED BY: LUDIVINA ELIZALDE     PROCEDURE DATE:  09/24/2023          INTERPRETATION:  CLINICAL HISTORY/REASON FOR EXAM: Abdominal pain.    TECHNIQUE: Initial contiguous axial CT images of the chest without IV   contrast obtained. Post-administration of 100 cc Omnipaque 350   intravenous contrast, CT angiographic axial images of the chest, abdomen   and pelviswere obtained. Coronal and sagittal reformats were performed.   3D (MIP) reformats obtained. 0 cc contrast discarded    COMPARISON: None.    FINDINGS:    VASCULATURE: No evidence of aortic dissection, intramural hematoma or   aneurysmal dilation. No central pulmonary embolus. Patent mesenteric   visceral arteries. Mixed plaque throughout aorta. Coronary artery   calcifications.    LUNGS, PLEURA, AIRWAYS: No lobar consolidation, mass, effusion, or   pneumothorax. No evidence of central endobronchial obstruction. No   bronchiectasis or honeycombing. No suspicious pulmonary nodule.    THORACIC NODES: No mediastinal, hilar, supraclavicular, or axillary   lymphadenopathy.    MEDIASTINUM: No pericardial effusion. Cardiomegaly. No adenopathy or mass.    HEPATOBILIARY: Mild dilation of common bile duct up to 0.7 cm with distal   CBD 0.4 cm calculus (series 11, image 112; series 8, image 299).   Cholelithiasis. Liver unremarkable.    SPLEEN: Unremarkable.    PANCREAS: Unremarkable.    ADRENAL GLANDS: Unremarkable.    KIDNEYS: Symmetric pattern of renal enhancement. No hydronephrosis   bilaterally.    ABDOMINAL NODES: No lymphadenopathy.    VISUALIZED PERITONEUM /MESENTERY/BOWEL/PELVIS: BPH. Urinary bladder   unremarkable. No bowel obstruction.No ascites. Normal appendix. No   pneumoperitoneum.    BONES/SOFT TISSUES:No acute osseous abnormality      IMPRESSION:    1. Mild dilation of common bile duct up to 0.7 cm with distal CBD 0.4 cm   choledocholith; correlation with LFTs suggested.    2. Cholelithiasis.    3. No evidence of acute aortic pathology.    --- End of Report ---            SHEN COLE MD; Attending Radiologist  This document has been electronically signed. Sep 24 2023  2:42AM (09-24-23 @ 02:19)    US Abdomen Upper Quadrant Right:   ACC: [id number] EXAM:  US ABDOMEN RT UPR QUADRANT   ORDERED BY: LUDIVINA ELIZALDE     PROCEDURE DATE:  09/24/2023          INTERPRETATION:  CLINICAL INFORMATION: Cholecystitis.    COMPARISON: CT from same day    TECHNIQUE: Sonography of the right upper quadrant.    FINDINGS:  Liver: Within normal limits.  Extra hepatic biliary ducts better evaluated on recent CT.  Distended gallbladder with cholelithiasis. No abnormal gallbladder wall   thickening. Negative reported sonographic Horn sign.  Pancreas obscured by overlying bowel gas.  Right kidney: 10.3 cm in length.. No hydronephrosis. 2.9 cm cyst  Ascites: None.  IVC: Visualized portions are within normal limits.    IMPRESSION:  Distended gallbladder with cholelithiasis. No abnormal gallbladder wall   thickening. Negative reported sonographic Horn sign.  Extra hepatic biliary ducts better evaluated on recent CT.    --- End of Report ---            SHEN COLE MD; Attending Radiologist  This document has been electronically signed. Sep24 2023  4:02AM (09-24-23 @ 04:00)

## 2023-09-24 NOTE — ED PROVIDER NOTE - NS ED ATTENDING STATEMENT MOD
This was a shared visit with the HENNA. I reviewed and verified the documentation and independently performed the documented:

## 2023-09-24 NOTE — H&P ADULT - HISTORY OF PRESENT ILLNESS
Pt is a 64 y/o M PMHx of HTN, asthma, hx R vertebral artery dissection presenting to the ED with abdominal pain. Pt reports yesterday he had severe sharp RUQ abdominal pain rated 9/10 in severity. Pain not associated with food intake and had an insidious onset. He preciously had minor aches on/off, but never this severe. On my examination pain 4/10, achy, and radiates from RUQ to back. Pt denies nausea, vomiting, chest pain, weakness, and numbness.    In ED vitals: /94 --> 136/81, HR 75, RR 18, Sat 98% on RA    Labs significant for: , , Lipase 79, Lactate 1.8, Trop <0.01, UA -    CT Angio Chest/Abd/Pelvis    RUQ US    Admitted to medicine Pt is a 64 y/o M PMHx of HTN, asthma, hx R vertebral artery dissection presenting to the ED with abdominal pain. Pt reports yesterday he had severe sharp RUQ abdominal pain rated 9/10 in severity. Pain not associated with food intake and had an insidious onset. He preciously had minor aches on/off, but never this severe. On my examination pain 4/10, achy, and radiates from RUQ to back. Pt denies nausea, vomiting, chest pain, weakness, and numbness.    In ED vitals: /94 --> 136/81, HR 75, RR 18, Sat 98% on RA    Labs significant for: , , Lipase 79, Lactate 1.8, Trop <0.01, UA -    CT Angio Chest/Abd/Pelvis    1. Mild dilation of common bile duct up to 0.7 cm with distal CBD 0.4 cm choledocholith; correlation with LFTs suggested.    2. Cholelithiasis.    3. No evidence of acute aortic pathology.      RUQ US    Distended gallbladder with cholelithiasis. No abnormal gallbladder wall thickening. Negative reported sonographic Horn sign.  Extra hepatic biliary ducts better evaluated on recent CT.    Admitted to medicine

## 2023-09-24 NOTE — ED PROVIDER NOTE - ATTENDING APP SHARED VISIT CONTRIBUTION OF CARE
Patient is c/o abd pain, associated with nasuea/ vomiting, also felt LT leg was sleepy, which had improved. Patient denies trauma. Patient is on anticoagulants.   Vitals reviewed.   Patient is awake, alert, answering questions appropriately, appears comfortable and not in any distress.  Lungs: CTA, no wheezing, no crackles.  abd: +BS, +epigastric tenderness, ND, soft,   CNS: awake, alert, o x 3, no focal neurologic deficits.  A/P: Abd pain with nausea/vomiting,   LLE symptoms had improved.   Labs, CTA, reevaluation.   CT head to r/o ICH.

## 2023-09-24 NOTE — CONSULT NOTE ADULT - ASSESSMENT
Pt is a 64 y/o M PMHx of HTN, asthma, hx R vertebral artery dissection presenting to the ED with abdominal pain.    # abdominal pian most likely 2/2 biliary cholic with cbd stone:  - hemodynamically stable  - no cholangitis  - labs reviewed  - exam reassuring  - CT/US reviewed     recommendations:  - low fat diet as tolerated today  - NPO after mid night for possible ERCP in AM as add on  - if fever or wbc start ABx  - trend LFTs  Pt is a 62 y/o M PMHx of HTN, asthma, hx R vertebral artery dissection presenting to the ED with abdominal pain.    # abdominal pian most likely 2/2 biliary colic with cbd stone:  - hemodynamically stable  - no cholangitis  - labs reviewed  - exam reassuring  - CT/US reviewed     recommendations:  - low fat diet as tolerated today  - NPO after mid night for possible ERCP in AM as add on  - if fever or wbc start ABx  - trend LFTs  Pt is a 62 y/o M PMHx of HTN, asthma, hx R vertebral artery dissection presenting to the ED with abdominal pain.    # abdominal pian most likely 2/2 biliary colic with cbd stone:  - hemodynamically stable  - no cholangitis  - labs reviewed  - exam reassuring  - CT/US reviewed     recommendations:  - low fat diet as tolerated today  - NPO after mid night for possible ERCP in AM as add on  - correct K, get INR, TYPE and screen  - d/c am lovenox  - if fever or wbc start ABx  - trend LFTs

## 2023-09-24 NOTE — ED ADULT NURSE NOTE - NSFALLUNIVINTERV_ED_ALL_ED
Bed/Stretcher in lowest position, wheels locked, appropriate side rails in place/Call bell, personal items and telephone in reach/Instruct patient to call for assistance before getting out of bed/chair/stretcher/Non-slip footwear applied when patient is off stretcher/[place] to call system/Physically safe environment - no spills, clutter or unnecessary equipment/Purposeful proactive rounding/Room/bathroom lighting operational, light cord in reach

## 2023-09-24 NOTE — ED PROVIDER NOTE - CARE PLAN
1 Principal Discharge DX:	Abdominal pain  Secondary Diagnosis:	Elevated liver function tests  Secondary Diagnosis:	Gall stone

## 2023-09-24 NOTE — ED PROVIDER NOTE - OBJECTIVE STATEMENT
62 yo male w a pmxh of verterbral artery disection on AC, HLD, gallstones presents to the ED complaining of abd pain. Patient w/ acute onset of RUQ abd pain, described as crampy, intermittent. Assocaited nausea without vomiting. No palliating or provoking factors. Denies fevers, chills, CP, SOB, diarrhea, dysuria.

## 2023-09-25 ENCOUNTER — TRANSCRIPTION ENCOUNTER (OUTPATIENT)
Age: 63
End: 2023-09-25

## 2023-09-25 LAB
ALBUMIN SERPL ELPH-MCNC: 3.9 G/DL — SIGNIFICANT CHANGE UP (ref 3.5–5.2)
ALBUMIN SERPL ELPH-MCNC: 4.6 G/DL — SIGNIFICANT CHANGE UP (ref 3.5–5.2)
ALP SERPL-CCNC: 68 U/L — SIGNIFICANT CHANGE UP (ref 30–115)
ALP SERPL-CCNC: 69 U/L — SIGNIFICANT CHANGE UP (ref 30–115)
ALT FLD-CCNC: 155 U/L — HIGH (ref 0–41)
ALT FLD-CCNC: 168 U/L — HIGH (ref 0–41)
ANION GAP SERPL CALC-SCNC: 10 MMOL/L — SIGNIFICANT CHANGE UP (ref 7–14)
ANION GAP SERPL CALC-SCNC: 7 MMOL/L — SIGNIFICANT CHANGE UP (ref 7–14)
APTT BLD: 29 SEC — SIGNIFICANT CHANGE UP (ref 27–39.2)
AST SERPL-CCNC: 104 U/L — HIGH (ref 0–41)
AST SERPL-CCNC: 95 U/L — HIGH (ref 0–41)
BASOPHILS # BLD AUTO: 0.02 K/UL — SIGNIFICANT CHANGE UP (ref 0–0.2)
BASOPHILS NFR BLD AUTO: 0.1 % — SIGNIFICANT CHANGE UP (ref 0–1)
BILIRUB SERPL-MCNC: 0.5 MG/DL — SIGNIFICANT CHANGE UP (ref 0.2–1.2)
BILIRUB SERPL-MCNC: 0.5 MG/DL — SIGNIFICANT CHANGE UP (ref 0.2–1.2)
BLD GP AB SCN SERPL QL: SIGNIFICANT CHANGE UP
BUN SERPL-MCNC: 21 MG/DL — HIGH (ref 10–20)
BUN SERPL-MCNC: 21 MG/DL — HIGH (ref 10–20)
CALCIUM SERPL-MCNC: 9.5 MG/DL — SIGNIFICANT CHANGE UP (ref 8.4–10.4)
CALCIUM SERPL-MCNC: 9.5 MG/DL — SIGNIFICANT CHANGE UP (ref 8.4–10.5)
CHLORIDE SERPL-SCNC: 100 MMOL/L — SIGNIFICANT CHANGE UP (ref 98–110)
CHLORIDE SERPL-SCNC: 101 MMOL/L — SIGNIFICANT CHANGE UP (ref 98–110)
CO2 SERPL-SCNC: 30 MMOL/L — SIGNIFICANT CHANGE UP (ref 17–32)
CO2 SERPL-SCNC: 30 MMOL/L — SIGNIFICANT CHANGE UP (ref 17–32)
CREAT SERPL-MCNC: 1.1 MG/DL — SIGNIFICANT CHANGE UP (ref 0.7–1.5)
CREAT SERPL-MCNC: 1.1 MG/DL — SIGNIFICANT CHANGE UP (ref 0.7–1.5)
CULTURE RESULTS: SIGNIFICANT CHANGE UP
EGFR: 75 ML/MIN/1.73M2 — SIGNIFICANT CHANGE UP
EGFR: 75 ML/MIN/1.73M2 — SIGNIFICANT CHANGE UP
EOSINOPHIL # BLD AUTO: 0.04 K/UL — SIGNIFICANT CHANGE UP (ref 0–0.7)
EOSINOPHIL NFR BLD AUTO: 0.3 % — SIGNIFICANT CHANGE UP (ref 0–8)
GLUCOSE SERPL-MCNC: 101 MG/DL — HIGH (ref 70–99)
GLUCOSE SERPL-MCNC: 140 MG/DL — HIGH (ref 70–99)
HCT VFR BLD CALC: 41.4 % — LOW (ref 42–52)
HCT VFR BLD CALC: 45.7 % — SIGNIFICANT CHANGE UP (ref 42–52)
HCV AB S/CO SERPL IA: 0.03 COI — SIGNIFICANT CHANGE UP
HCV AB SERPL-IMP: SIGNIFICANT CHANGE UP
HGB BLD-MCNC: 13.7 G/DL — LOW (ref 14–18)
HGB BLD-MCNC: 15.3 G/DL — SIGNIFICANT CHANGE UP (ref 14–18)
IMM GRANULOCYTES NFR BLD AUTO: 0.4 % — HIGH (ref 0.1–0.3)
INR BLD: 0.97 RATIO — SIGNIFICANT CHANGE UP (ref 0.65–1.3)
LYMPHOCYTES # BLD AUTO: 15.5 % — LOW (ref 20.5–51.1)
LYMPHOCYTES # BLD AUTO: 2.14 K/UL — SIGNIFICANT CHANGE UP (ref 1.2–3.4)
MAGNESIUM SERPL-MCNC: 2.4 MG/DL — SIGNIFICANT CHANGE UP (ref 1.8–2.4)
MAGNESIUM SERPL-MCNC: 2.4 MG/DL — SIGNIFICANT CHANGE UP (ref 1.8–2.4)
MCHC RBC-ENTMCNC: 29.5 PG — SIGNIFICANT CHANGE UP (ref 27–31)
MCHC RBC-ENTMCNC: 29.8 PG — SIGNIFICANT CHANGE UP (ref 27–31)
MCHC RBC-ENTMCNC: 33.1 G/DL — SIGNIFICANT CHANGE UP (ref 32–37)
MCHC RBC-ENTMCNC: 33.5 G/DL — SIGNIFICANT CHANGE UP (ref 32–37)
MCV RBC AUTO: 88.9 FL — SIGNIFICANT CHANGE UP (ref 80–94)
MCV RBC AUTO: 89 FL — SIGNIFICANT CHANGE UP (ref 80–94)
MONOCYTES # BLD AUTO: 1.15 K/UL — HIGH (ref 0.1–0.6)
MONOCYTES NFR BLD AUTO: 8.3 % — SIGNIFICANT CHANGE UP (ref 1.7–9.3)
NEUTROPHILS # BLD AUTO: 10.39 K/UL — HIGH (ref 1.4–6.5)
NEUTROPHILS NFR BLD AUTO: 75.4 % — HIGH (ref 42.2–75.2)
NRBC # BLD: 0 /100 WBCS — SIGNIFICANT CHANGE UP (ref 0–0)
NRBC # BLD: 0 /100 WBCS — SIGNIFICANT CHANGE UP (ref 0–0)
PHOSPHATE SERPL-MCNC: 3 MG/DL — SIGNIFICANT CHANGE UP (ref 2.1–4.9)
PLATELET # BLD AUTO: 277 K/UL — SIGNIFICANT CHANGE UP (ref 130–400)
PLATELET # BLD AUTO: 294 K/UL — SIGNIFICANT CHANGE UP (ref 130–400)
PMV BLD: 9.2 FL — SIGNIFICANT CHANGE UP (ref 7.4–10.4)
PMV BLD: 9.3 FL — SIGNIFICANT CHANGE UP (ref 7.4–10.4)
POTASSIUM SERPL-MCNC: 4.5 MMOL/L — SIGNIFICANT CHANGE UP (ref 3.5–5)
POTASSIUM SERPL-MCNC: 4.6 MMOL/L — SIGNIFICANT CHANGE UP (ref 3.5–5)
POTASSIUM SERPL-SCNC: 4.5 MMOL/L — SIGNIFICANT CHANGE UP (ref 3.5–5)
POTASSIUM SERPL-SCNC: 4.6 MMOL/L — SIGNIFICANT CHANGE UP (ref 3.5–5)
PROT SERPL-MCNC: 6.5 G/DL — SIGNIFICANT CHANGE UP (ref 6–8)
PROT SERPL-MCNC: 7.3 G/DL — SIGNIFICANT CHANGE UP (ref 6–8)
PROTHROM AB SERPL-ACNC: 11.1 SEC — SIGNIFICANT CHANGE UP (ref 9.95–12.87)
RBC # BLD: 4.65 M/UL — LOW (ref 4.7–6.1)
RBC # BLD: 5.14 M/UL — SIGNIFICANT CHANGE UP (ref 4.7–6.1)
RBC # FLD: 12.4 % — SIGNIFICANT CHANGE UP (ref 11.5–14.5)
RBC # FLD: 12.6 % — SIGNIFICANT CHANGE UP (ref 11.5–14.5)
SODIUM SERPL-SCNC: 138 MMOL/L — SIGNIFICANT CHANGE UP (ref 135–146)
SODIUM SERPL-SCNC: 140 MMOL/L — SIGNIFICANT CHANGE UP (ref 135–146)
SPECIMEN SOURCE: SIGNIFICANT CHANGE UP
WBC # BLD: 13.8 K/UL — HIGH (ref 4.8–10.8)
WBC # BLD: 9.79 K/UL — SIGNIFICANT CHANGE UP (ref 4.8–10.8)
WBC # FLD AUTO: 13.8 K/UL — HIGH (ref 4.8–10.8)
WBC # FLD AUTO: 9.79 K/UL — SIGNIFICANT CHANGE UP (ref 4.8–10.8)

## 2023-09-25 PROCEDURE — 43264 ERCP REMOVE DUCT CALCULI: CPT

## 2023-09-25 PROCEDURE — 99223 1ST HOSP IP/OBS HIGH 75: CPT

## 2023-09-25 PROCEDURE — 43262 ENDO CHOLANGIOPANCREATOGRAPH: CPT | Mod: XU

## 2023-09-25 PROCEDURE — 99232 SBSQ HOSP IP/OBS MODERATE 35: CPT

## 2023-09-25 PROCEDURE — 99223 1ST HOSP IP/OBS HIGH 75: CPT | Mod: 25

## 2023-09-25 PROCEDURE — 74328 X-RAY BILE DUCT ENDOSCOPY: CPT | Mod: 26

## 2023-09-25 RX ORDER — METRONIDAZOLE 500 MG
500 TABLET ORAL EVERY 8 HOURS
Refills: 0 | Status: DISCONTINUED | OUTPATIENT
Start: 2023-09-25 | End: 2023-09-26

## 2023-09-25 RX ORDER — SODIUM CHLORIDE 9 MG/ML
1000 INJECTION INTRAMUSCULAR; INTRAVENOUS; SUBCUTANEOUS
Refills: 0 | Status: DISCONTINUED | OUTPATIENT
Start: 2023-09-25 | End: 2023-09-26

## 2023-09-25 RX ORDER — METRONIDAZOLE 500 MG
500 TABLET ORAL ONCE
Refills: 0 | Status: COMPLETED | OUTPATIENT
Start: 2023-09-25 | End: 2023-09-25

## 2023-09-25 RX ORDER — CIPROFLOXACIN LACTATE 400MG/40ML
400 VIAL (ML) INTRAVENOUS ONCE
Refills: 0 | Status: COMPLETED | OUTPATIENT
Start: 2023-09-25 | End: 2023-09-25

## 2023-09-25 RX ORDER — CIPROFLOXACIN LACTATE 400MG/40ML
VIAL (ML) INTRAVENOUS
Refills: 0 | Status: DISCONTINUED | OUTPATIENT
Start: 2023-09-25 | End: 2023-09-26

## 2023-09-25 RX ORDER — METRONIDAZOLE 500 MG
TABLET ORAL
Refills: 0 | Status: DISCONTINUED | OUTPATIENT
Start: 2023-09-25 | End: 2023-09-26

## 2023-09-25 RX ORDER — CIPROFLOXACIN LACTATE 400MG/40ML
400 VIAL (ML) INTRAVENOUS EVERY 12 HOURS
Refills: 0 | Status: DISCONTINUED | OUTPATIENT
Start: 2023-09-25 | End: 2023-09-26

## 2023-09-25 RX ORDER — INDOMETHACIN 50 MG
100 CAPSULE ORAL ONCE
Refills: 0 | Status: COMPLETED | OUTPATIENT
Start: 2023-09-25 | End: 2023-09-25

## 2023-09-25 RX ADMIN — ATORVASTATIN CALCIUM 10 MILLIGRAM(S): 80 TABLET, FILM COATED ORAL at 21:22

## 2023-09-25 RX ADMIN — Medication 100 MILLIGRAM(S): at 16:08

## 2023-09-25 RX ADMIN — Medication 100 MILLIGRAM(S): at 21:23

## 2023-09-25 RX ADMIN — Medication 100 MILLIGRAM(S): at 11:51

## 2023-09-25 RX ADMIN — POLYETHYLENE GLYCOL 3350 17 GRAM(S): 17 POWDER, FOR SOLUTION ORAL at 11:51

## 2023-09-25 RX ADMIN — SENNA PLUS 2 TABLET(S): 8.6 TABLET ORAL at 21:23

## 2023-09-25 RX ADMIN — Medication 25 MILLIGRAM(S): at 05:26

## 2023-09-25 RX ADMIN — Medication 200 MILLIGRAM(S): at 18:44

## 2023-09-25 RX ADMIN — SODIUM CHLORIDE 100 MILLILITER(S): 9 INJECTION INTRAMUSCULAR; INTRAVENOUS; SUBCUTANEOUS at 18:44

## 2023-09-25 RX ADMIN — Medication 81 MILLIGRAM(S): at 11:51

## 2023-09-25 RX ADMIN — Medication 200 MILLIGRAM(S): at 11:50

## 2023-09-25 NOTE — CHART NOTE - NSCHARTNOTEFT_GEN_A_CORE
Patient is s/p ERCP with sphincterotomy . Biliary sludge removed.    REC:  Clear liquid diet   Watch for post -ERCP complications   Advance diet in am if uneventful   Trend LFTs

## 2023-09-25 NOTE — CONSULT NOTE ADULT - SUBJECTIVE AND OBJECTIVE BOX
GENERAL SURGERY CONSULT NOTE    Patient: ZHANNA HART , 63y ([birth year])Male   MRN: [id number]  Location: [facility] 4C (Back) 020 A  Visit: 09-24-23 Inpatient  Date: 09-25-23 @ 10:40    HPI:  Pt is a 64 y/o M PMHx of HTN, asthma, hx R vertebral artery dissection presenting to the ED with abdominal pain. Pt reports yesterday he had severe sharp RUQ abdominal pain rated 9/10 in severity. Pain not associated with food intake and had an insidious onset. He preciously had minor aches on/off, but never this severe. On my examination pain 4/10, achy, and radiates from RUQ to back. Pt denies nausea, vomiting, chest pain, weakness, and numbness.    In ED vitals: /94 --> 136/81, HR 75, RR 18, Sat 98% on RA    Labs significant for: , , Lipase 79, Lactate 1.8, Trop <0.01, UA -    CT Angio Chest/Abd/Pelvis    1. Mild dilation of common bile duct up to 0.7 cm with distal CBD 0.4 cm choledocholith; correlation with LFTs suggested.    2. Cholelithiasis.    3. No evidence of acute aortic pathology.      RUQ US    Distended gallbladder with cholelithiasis. No abnormal gallbladder wall thickening. Negative reported sonographic Horn sign.  Extra hepatic biliary ducts better evaluated on recent CT.    Admitted to medicine (24 Sep 2023 08:13)      PAST MEDICAL & SURGICAL HISTORY:  Stroke      HTN (hypertension)      Asthma      Aneurysm of vertebral artery          Home Medications:  aspirin 81 mg oral delayed release tablet: 1 tab(s) orally once a day (24 Sep 2023 08:13)  Breo Ellipta 100 mcg-25 mcg/inh inhalation powder: 1 puff(s) inhaled once a day (24 Sep 2023 08:12)  Lipitor 10 mg oral tablet: 1 tab(s) orally once a day (24 Sep 2023 08:13)  metoprolol succinate 25 mg oral tablet, extended release: 1 tab(s) orally once a day (24 Sep 2023 08:12)  ProAir HFA 90 mcg/inh inhalation aerosol: 2 puff(s) inhaled 4 times a day, As Needed (24 Sep 2023 08:13)        VITALS:  T(F): 97.7 (09-25-23 @ 08:42), Max: 97.8 (09-24-23 @ 12:24)  HR: 57 (09-25-23 @ 08:42) (57 - 72)  BP: 128/81 (09-25-23 @ 08:42) (120/64 - 144/81)  RR: 18 (09-25-23 @ 08:42) (18 - 18)  SpO2: 96% (09-25-23 @ 08:42) (96% - 97%)    PHYSICAL EXAM:  General: NAD, AAOx3, calm and cooperative  HEENT: NCAT, VALORIE, EOMI, Trachea ML, Neck supple  Cardiac: RRR S1, S2, no Murmurs, rubs or gallops  Respiratory: CTAB, normal respiratory effort, breath sounds equal BL, no wheeze, rhonchi or crackles  Abdomen: Soft, non-distended, non-tender, no rebound, no guarding. +BS.  Rectal: Good tone, +stool, no blood, no rosendo-anal masses/lesions, no fistulas, fissures, hemorrhoids  Musculoskeletal: Strength 5/5 BL UE/LE, ROM intact, compartments soft  Neuro: Sensation grossly intact and equal throughout, no focal deficits  Vascular: Pulses 2+ throughout, extremities well perfused  Skin: Warm/dry, normal color, no jaundice  Incision/wound: healing well, dressings in place, clean, dry and intact    MEDICATIONS  (STANDING):  aspirin enteric coated 81 milliGRAM(s) Oral daily  atorvastatin 10 milliGRAM(s) Oral at bedtime  enoxaparin Injectable 40 milliGRAM(s) SubCutaneous every 24 hours  influenza   Vaccine 0.5 milliLiter(s) IntraMuscular once  metoprolol succinate ER 25 milliGRAM(s) Oral daily  polyethylene glycol 3350 17 Gram(s) Oral daily  senna 2 Tablet(s) Oral at bedtime    MEDICATIONS  (PRN):  albuterol    90 MICROgram(s) HFA Inhaler 2 Puff(s) Inhalation every 6 hours PRN Shortness of Breath and/or Wheezing  albuterol/ipratropium for Nebulization 3 milliLiter(s) Nebulizer every 6 hours PRN Shortness of Breath and/or Wheezing  aluminum hydroxide/magnesium hydroxide/simethicone Suspension 30 milliLiter(s) Oral every 4 hours PRN Dyspepsia  melatonin 3 milliGRAM(s) Oral at bedtime PRN Insomnia  morphine  - Injectable 2 milliGRAM(s) IV Push every 6 hours PRN Pain 4-10  ondansetron Injectable 4 milliGRAM(s) IV Push every 8 hours PRN Nausea and/or Vomiting      LAB/STUDIES:                        13.7   13.80 )-----------( 277      ( 25 Sep 2023 06:07 )             41.4     09-25    138  |  101  |  21<H>  ----------------------------<  101<H>  4.6   |  30  |  1.1    Ca    9.5      25 Sep 2023 06:07  Mg     2.4     09-25    TPro  6.5  /  Alb  3.9  /  TBili  0.5  /  DBili  x   /  AST  104<H>  /  ALT  168<H>  /  AlkPhos  69  09-25    PT/INR - ( 25 Sep 2023 06:07 )   PT: 11.10 sec;   INR: 0.97 ratio         PTT - ( 25 Sep 2023 06:07 )  PTT:29.0 sec  LIVER FUNCTIONS - ( 25 Sep 2023 06:07 )  Alb: 3.9 g/dL / Pro: 6.5 g/dL / ALK PHOS: 69 U/L / ALT: 168 U/L / AST: 104 U/L / GGT: x           Urinalysis Basic - ( 25 Sep 2023 06:07 )    Color: x / Appearance: x / SG: x / pH: x  Gluc: 101 mg/dL / Ketone: x  / Bili: x / Urobili: x   Blood: x / Protein: x / Nitrite: x   Leuk Esterase: x / RBC: x / WBC x   Sq Epi: x / Non Sq Epi: x / Bacteria: x      CARDIAC MARKERS ( 23 Sep 2023 23:27 )  x     / <0.01 ng/mL / x     / x     / x                  IMAGING:      ACCESS DEVICES:  [ ] Peripheral IV  [ ] Central Venous Line	[ ] R	[ ] L	[ ] IJ	[ ] Fem	[ ] SC	Placed:   [ ] Arterial Line		[ ] R	[ ] L	[ ] Fem	[ ] Rad	[ ] Ax	Placed:   [ ] PICC:					[ ] Mediport  [ ] Urinary Catheter, Date Placed:      GENERAL SURGERY CONSULT NOTE    Patient: ZHANNA HART , 63y ([birth year])Male   MRN: [id number]  Location: [facility] 4C (Back) 020 A  Visit: 09-24-23 Inpatient  Date: 09-25-23 @ 10:40    HPI:  63 year old male with a medical history of HTN, asthma, history of Right vertebral artery dissection, presented to the ED complaining with abdominal pain. Patient reports pain started on 9/23, and described as sharp , 9/10 in the RUQ. Pain not associated with food intake. Patient denied with nausea, vomiting, chest pian, weakness and numbness. Work up included US shwoing distended gallbladder with cholelithiasis, CT mild dilation of common bile duct up to 0.7 cm with distal CBD 0.4 cm choledocholith and Transaminitis. Gastroenterology planing to perform a ERCP on 9/25/23 .General surgery consulted for evaluation and further treatment.       In ED vitals: /94 --> 136/81, HR 75, RR 18, Sat 98% on RA    Labs significant for: , , Lipase 79, Lactate 1.8, Trop <0.01, UA -    CT Angio Chest/Abd/Pelvis    1. Mild dilation of common bile duct up to 0.7 cm with distal CBD 0.4 cm choledocholith; correlation with LFTs suggested.    2. Cholelithiasis.    3. No evidence of acute aortic pathology.      RUQ US    Distended gallbladder with cholelithiasis. No abnormal gallbladder wall thickening. Negative reported sonographic Horn sign.  Extra hepatic biliary ducts better evaluated on recent CT.    Admitted to medicine (24 Sep 2023 08:13)      PAST MEDICAL & SURGICAL HISTORY:  Stroke      HTN (hypertension)      Asthma      Aneurysm of vertebral artery          Home Medications:  aspirin 81 mg oral delayed release tablet: 1 tab(s) orally once a day (24 Sep 2023 08:13)  Breo Ellipta 100 mcg-25 mcg/inh inhalation powder: 1 puff(s) inhaled once a day (24 Sep 2023 08:12)  Lipitor 10 mg oral tablet: 1 tab(s) orally once a day (24 Sep 2023 08:13)  metoprolol succinate 25 mg oral tablet, extended release: 1 tab(s) orally once a day (24 Sep 2023 08:12)  ProAir HFA 90 mcg/inh inhalation aerosol: 2 puff(s) inhaled 4 times a day, As Needed (24 Sep 2023 08:13)        VITALS:  T(F): 97.7 (09-25-23 @ 08:42), Max: 97.8 (09-24-23 @ 12:24)  HR: 57 (09-25-23 @ 08:42) (57 - 72)  BP: 128/81 (09-25-23 @ 08:42) (120/64 - 144/81)  RR: 18 (09-25-23 @ 08:42) (18 - 18)  SpO2: 96% (09-25-23 @ 08:42) (96% - 97%)    PHYSICAL EXAM:  General: NAD, AAOx3, calm and cooperative  HEENT: NCAT, VALORIE, EOMI, Trachea ML, Neck supple  Cardiac: RRR S1, S2, no Murmurs, rubs or gallops  Respiratory: CTAB, normal respiratory effort, breath sounds equal BL, no wheeze, rhonchi or crackles  Abdomen: Soft, non-distended, non-tender, no rebound, no guarding. +BS.  Rectal: Good tone, +stool, no blood, no rosendo-anal masses/lesions, no fistulas, fissures, hemorrhoids  Musculoskeletal: Strength 5/5 BL UE/LE, ROM intact, compartments soft  Neuro: Sensation grossly intact and equal throughout, no focal deficits  Vascular: Pulses 2+ throughout, extremities well perfused  Skin: Warm/dry, normal color, no jaundice  Incision/wound: healing well, dressings in place, clean, dry and intact    MEDICATIONS  (STANDING):  aspirin enteric coated 81 milliGRAM(s) Oral daily  atorvastatin 10 milliGRAM(s) Oral at bedtime  enoxaparin Injectable 40 milliGRAM(s) SubCutaneous every 24 hours  influenza   Vaccine 0.5 milliLiter(s) IntraMuscular once  metoprolol succinate ER 25 milliGRAM(s) Oral daily  polyethylene glycol 3350 17 Gram(s) Oral daily  senna 2 Tablet(s) Oral at bedtime    MEDICATIONS  (PRN):  albuterol    90 MICROgram(s) HFA Inhaler 2 Puff(s) Inhalation every 6 hours PRN Shortness of Breath and/or Wheezing  albuterol/ipratropium for Nebulization 3 milliLiter(s) Nebulizer every 6 hours PRN Shortness of Breath and/or Wheezing  aluminum hydroxide/magnesium hydroxide/simethicone Suspension 30 milliLiter(s) Oral every 4 hours PRN Dyspepsia  melatonin 3 milliGRAM(s) Oral at bedtime PRN Insomnia  morphine  - Injectable 2 milliGRAM(s) IV Push every 6 hours PRN Pain 4-10  ondansetron Injectable 4 milliGRAM(s) IV Push every 8 hours PRN Nausea and/or Vomiting      LAB/STUDIES:                        13.7   13.80 )-----------( 277      ( 25 Sep 2023 06:07 )             41.4     09-25    138  |  101  |  21<H>  ----------------------------<  101<H>  4.6   |  30  |  1.1    Ca    9.5      25 Sep 2023 06:07  Mg     2.4     09-25    TPro  6.5  /  Alb  3.9  /  TBili  0.5  /  DBili  x   /  AST  104<H>  /  ALT  168<H>  /  AlkPhos  69  09-25    PT/INR - ( 25 Sep 2023 06:07 )   PT: 11.10 sec;   INR: 0.97 ratio         PTT - ( 25 Sep 2023 06:07 )  PTT:29.0 sec  LIVER FUNCTIONS - ( 25 Sep 2023 06:07 )  Alb: 3.9 g/dL / Pro: 6.5 g/dL / ALK PHOS: 69 U/L / ALT: 168 U/L / AST: 104 U/L / GGT: x           Urinalysis Basic - ( 25 Sep 2023 06:07 )    Color: x / Appearance: x / SG: x / pH: x  Gluc: 101 mg/dL / Ketone: x  / Bili: x / Urobili: x   Blood: x / Protein: x / Nitrite: x   Leuk Esterase: x / RBC: x / WBC x   Sq Epi: x / Non Sq Epi: x / Bacteria: x      CARDIAC MARKERS ( 23 Sep 2023 23:27 )  x     / <0.01 ng/mL / x     / x     / x                  IMAGING:    Labs significant for: , , Lipase 79, Lactate 1.8, Trop <0.01, UA -    CT Angio Chest/Abd/Pelvis    1. Mild dilation of common bile duct up to 0.7 cm with distal CBD 0.4 cm choledocholith; correlation with LFTs suggested.    2. Cholelithiasis.    3. No evidence of acute aortic pathology.      RUQ US    Distended gallbladder with cholelithiasis. No abnormal gallbladder wall thickening. Negative reported sonographic Horn sign.  Extra hepatic biliary ducts better evaluated on recent CT.    ACCESS DEVICES:  [ ] Peripheral IV  [ ] Central Venous Line	[ ] R	[ ] L	[ ] IJ	[ ] Fem	[ ] SC	Placed:   [ ] Arterial Line		[ ] R	[ ] L	[ ] Fem	[ ] Rad	[ ] Ax	Placed:   [ ] PICC:					[ ] Mediport  [ ] Urinary Catheter, Date Placed:

## 2023-09-25 NOTE — CONSULT NOTE ADULT - ATTENDING COMMENTS
Patient seen   likely choledocho lithiasis  lfts improving    will schedule lap jammie after ERCP finding.

## 2023-09-25 NOTE — CONSULT NOTE ADULT - ASSESSMENT
ASSESSMENT:  63yM w/ PMHx of  ***  who presented with ***. Physical exam findings, imaging, and labs as documented above.     PLAN:  -NPO at midnight  -Add on case for Lap cholecystectomy on 9/26/2023  with Dr. Navarrete  -    Lines/Tubes: PIV, Midline, Central Line, A-Line, Chest tubes, Andrew/MERYL drains, Chairez Catheter.    Above plan discussed with Attending Surgeon  ***  , patient, patient family, and Primary team  09-25-23 @ 10:40   ASSESSMENT:  63yM w/ PMHx of  ***  who presented with ***. Physical exam findings, imaging, and labs as documented above.   PENDING FULL PLAN  PLAN:  -NPO at midnight  -Add on case for Lap cholecystectomy on 9/26/2023  with Dr. Navarrete  -    Lines/Tubes: PIV, Midline, Central Line, A-Line, Chest tubes, Andrew/MERYL drains, Chairez Catheter.    Above plan discussed with Attending Surgeon  ***  , patient, patient family, and Primary team  09-25-23 @ 10:40   ASSESSMENT:  63 year old male with a medical history of HTN, asthma, history of Right vertebral artery dissection, presented to the ED complaining with abdominal pain. Patient reports pain started on 9/23, and described as sharp , 9/10 in the RUQ. Pain not associated with food intake. Patient denied with nausea, vomiting, chest pian, weakness and numbness. Work up included US shwoing distended gallbladder with cholelithiasis, CT mild dilation of common bile duct up to 0.7 cm with distal CBD 0.4 cm choledocholith and Transaminitis. Gastroenterology planing to perform a ERCP on 9/25/23 .  Laparoscopic cholecystectomy planned. Risks, benefit and complications of procedure discussed including, but not limited to: pain, bleeding, infection, injury to nearby structures. Patient verbalized and whishes to proceed.     PLAN:  -NPO at midnight  -Add on case for Lap cholecystectomy, possible open on 9/26/2023  with Dr. Navarrete  -Rest per primary     Above plan discussed with Attending Surgeon Dr. Navarrete  , patient, patient family, and Primary team  09-25-23 @ 10:40   ASSESSMENT:  63 year old male with a medical history of HTN, asthma, history of Right vertebral artery dissection, presented to the ED complaining with abdominal pain. Patient reports pain started on 9/23, and described as sharp , 9/10 in the RUQ. Pain not associated with food intake. Patient denied with nausea, vomiting, chest pian, weakness and numbness. Work up included US shwoing distended gallbladder with cholelithiasis, CT mild dilation of common bile duct up to 0.7 cm with distal CBD 0.4 cm choledocholith and Transaminitis. Gastroenterology planing to perform a ERCP on 9/25/23 .  Laparoscopic cholecystectomy planned. Risks, benefit and complications of procedure discussed including, but not limited to: pain, bleeding, infection, injury to nearby structures. Patient verbalized and whishes to proceed.     PLAN:  -NPO at midnight  -Add on case for Lap cholecystectomy, possible open on 9/26/2023  with Dr. Navarrete  - Please make sure patient is NPO @ [facility] tonight and please send preop labs which include CBC, BMP, Mag, Phosp, type and screen, coagulation studies, EKG, CXR.    -Rest per primary     Above plan discussed with Attending Surgeon Dr. Navarrete  , patient, patient family, and Primary team  09-25-23 @ 10:40   ASSESSMENT:  63 year old male with a medical history of HTN, asthma, history of Right vertebral artery dissection, presented to the ED complaining with abdominal pain. Patient reports pain started on 9/23, and described as sharp , 9/10 in the RUQ. Pain not associated with food intake. Patient denied with nausea, vomiting, chest pian, weakness and numbness. Work up included US shwoing distended gallbladder with cholelithiasis, CT mild dilation of common bile duct up to 0.7 cm with distal CBD 0.4 cm choledocholith and Transaminitis. Gastroenterology planing to perform a ERCP on 9/25/23 . Likely choledocholithiasis  Laparoscopic cholecystectomy planned. Risks, benefit and complications of procedure discussed including, but not limited to: pain, bleeding, infection, injury to nearby structures. Patient verbalized and whishes to proceed.     PLAN:  -NPO at midnight  -Add on case for Lap cholecystectomy, possible open on 9/26/2023  with Dr. Navarrete  - [facility] and please send preop labs which include CBC, BMP, Mag, Phosp, type and screen, coagulation studies, EKG, CXR.    -Rest per primary     Above plan discussed with Attending Surgeon Dr. Navarrete  , patient, patient family, and Primary team  09-25-23 @ 10:40

## 2023-09-26 ENCOUNTER — TRANSCRIPTION ENCOUNTER (OUTPATIENT)
Age: 63
End: 2023-09-26

## 2023-09-26 ENCOUNTER — RESULT REVIEW (OUTPATIENT)
Age: 63
End: 2023-09-26

## 2023-09-26 PROCEDURE — 99233 SBSQ HOSP IP/OBS HIGH 50: CPT | Mod: 57

## 2023-09-26 PROCEDURE — 64488 TAP BLOCK BI INJECTION: CPT

## 2023-09-26 PROCEDURE — 88304 TISSUE EXAM BY PATHOLOGIST: CPT | Mod: 26

## 2023-09-26 PROCEDURE — 99233 SBSQ HOSP IP/OBS HIGH 50: CPT

## 2023-09-26 PROCEDURE — S2900 ROBOTIC SURGICAL SYSTEM: CPT | Mod: NC

## 2023-09-26 PROCEDURE — 99232 SBSQ HOSP IP/OBS MODERATE 35: CPT

## 2023-09-26 PROCEDURE — 47563 LAPARO CHOLECYSTECTOMY/GRAPH: CPT | Mod: 22

## 2023-09-26 RX ORDER — CIPROFLOXACIN LACTATE 400MG/40ML
400 VIAL (ML) INTRAVENOUS EVERY 12 HOURS
Refills: 0 | Status: DISCONTINUED | OUTPATIENT
Start: 2023-09-26 | End: 2023-09-27

## 2023-09-26 RX ORDER — MORPHINE SULFATE 50 MG/1
2 CAPSULE, EXTENDED RELEASE ORAL EVERY 6 HOURS
Refills: 0 | Status: DISCONTINUED | OUTPATIENT
Start: 2023-09-26 | End: 2023-09-27

## 2023-09-26 RX ORDER — IPRATROPIUM/ALBUTEROL SULFATE 18-103MCG
3 AEROSOL WITH ADAPTER (GRAM) INHALATION EVERY 6 HOURS
Refills: 0 | Status: DISCONTINUED | OUTPATIENT
Start: 2023-09-26 | End: 2023-09-27

## 2023-09-26 RX ORDER — METRONIDAZOLE 500 MG
500 TABLET ORAL EVERY 8 HOURS
Refills: 0 | Status: DISCONTINUED | OUTPATIENT
Start: 2023-09-26 | End: 2023-09-27

## 2023-09-26 RX ORDER — ONDANSETRON 8 MG/1
4 TABLET, FILM COATED ORAL EVERY 8 HOURS
Refills: 0 | Status: DISCONTINUED | OUTPATIENT
Start: 2023-09-26 | End: 2023-09-27

## 2023-09-26 RX ORDER — SODIUM CHLORIDE 9 MG/ML
1000 INJECTION, SOLUTION INTRAVENOUS
Refills: 0 | Status: DISCONTINUED | OUTPATIENT
Start: 2023-09-26 | End: 2023-09-26

## 2023-09-26 RX ORDER — LANOLIN ALCOHOL/MO/W.PET/CERES
3 CREAM (GRAM) TOPICAL AT BEDTIME
Refills: 0 | Status: DISCONTINUED | OUTPATIENT
Start: 2023-09-26 | End: 2023-09-27

## 2023-09-26 RX ORDER — ATORVASTATIN CALCIUM 80 MG/1
10 TABLET, FILM COATED ORAL AT BEDTIME
Refills: 0 | Status: DISCONTINUED | OUTPATIENT
Start: 2023-09-26 | End: 2023-09-27

## 2023-09-26 RX ORDER — ASPIRIN/CALCIUM CARB/MAGNESIUM 324 MG
81 TABLET ORAL DAILY
Refills: 0 | Status: DISCONTINUED | OUTPATIENT
Start: 2023-09-26 | End: 2023-09-27

## 2023-09-26 RX ORDER — METOPROLOL TARTRATE 50 MG
25 TABLET ORAL DAILY
Refills: 0 | Status: DISCONTINUED | OUTPATIENT
Start: 2023-09-26 | End: 2023-09-27

## 2023-09-26 RX ORDER — SODIUM CHLORIDE 9 MG/ML
1000 INJECTION INTRAMUSCULAR; INTRAVENOUS; SUBCUTANEOUS
Refills: 0 | Status: DISCONTINUED | OUTPATIENT
Start: 2023-09-26 | End: 2023-09-27

## 2023-09-26 RX ORDER — ALBUTEROL 90 UG/1
2 AEROSOL, METERED ORAL EVERY 6 HOURS
Refills: 0 | Status: DISCONTINUED | OUTPATIENT
Start: 2023-09-26 | End: 2023-09-27

## 2023-09-26 RX ORDER — MORPHINE SULFATE 50 MG/1
4 CAPSULE, EXTENDED RELEASE ORAL
Refills: 0 | Status: DISCONTINUED | OUTPATIENT
Start: 2023-09-26 | End: 2023-09-26

## 2023-09-26 RX ORDER — SENNA PLUS 8.6 MG/1
2 TABLET ORAL AT BEDTIME
Refills: 0 | Status: DISCONTINUED | OUTPATIENT
Start: 2023-09-26 | End: 2023-09-27

## 2023-09-26 RX ORDER — POLYETHYLENE GLYCOL 3350 17 G/17G
17 POWDER, FOR SOLUTION ORAL DAILY
Refills: 0 | Status: DISCONTINUED | OUTPATIENT
Start: 2023-09-26 | End: 2023-09-27

## 2023-09-26 RX ORDER — ASPIRIN/CALCIUM CARB/MAGNESIUM 324 MG
81 TABLET ORAL ONCE
Refills: 0 | Status: DISCONTINUED | OUTPATIENT
Start: 2023-09-26 | End: 2023-09-26

## 2023-09-26 RX ORDER — METOPROLOL TARTRATE 50 MG
25 TABLET ORAL ONCE
Refills: 0 | Status: COMPLETED | OUTPATIENT
Start: 2023-09-26 | End: 2023-09-26

## 2023-09-26 RX ADMIN — SODIUM CHLORIDE 100 MILLILITER(S): 9 INJECTION INTRAMUSCULAR; INTRAVENOUS; SUBCUTANEOUS at 20:00

## 2023-09-26 RX ADMIN — Medication 200 MILLIGRAM(S): at 05:52

## 2023-09-26 RX ADMIN — Medication 25 MILLIGRAM(S): at 13:14

## 2023-09-26 RX ADMIN — ATORVASTATIN CALCIUM 10 MILLIGRAM(S): 80 TABLET, FILM COATED ORAL at 21:03

## 2023-09-26 RX ADMIN — Medication 100 MILLIGRAM(S): at 21:07

## 2023-09-26 RX ADMIN — SODIUM CHLORIDE 75 MILLILITER(S): 9 INJECTION, SOLUTION INTRAVENOUS at 18:33

## 2023-09-26 RX ADMIN — Medication 100 MILLIGRAM(S): at 05:52

## 2023-09-26 RX ADMIN — Medication 81 MILLIGRAM(S): at 13:13

## 2023-09-26 RX ADMIN — SODIUM CHLORIDE 100 MILLILITER(S): 9 INJECTION INTRAMUSCULAR; INTRAVENOUS; SUBCUTANEOUS at 05:52

## 2023-09-26 RX ADMIN — SENNA PLUS 2 TABLET(S): 8.6 TABLET ORAL at 21:02

## 2023-09-26 NOTE — BRIEF OPERATIVE NOTE - NSICDXBRIEFPROCEDURE_GEN_ALL_CORE_FT
PROCEDURES:  Robot-assisted laparoscopic cholecystectomy using indocyanine green (ICG) fluorescence imaging system and da Rc Xi 26-Sep-2023 18:34:00  Matt Little, transversus abdominis plane, bilateral 26-Sep-2023 18:47:57  Matt Little

## 2023-09-26 NOTE — CHART NOTE - NSCHARTNOTEFT_GEN_A_CORE
Post Operative Note  Patient: ZHANNA HART 63y (1960) Male   MRN: [id number]  Location: [address] (Back) 020 A  Visit: 09-24-23 Inpatient  Date: 09-26-23 @ 23:02    Procedure:   - S/P Robot-assisted laparoscopic cholecystectomy using indocyanine green (ICG) fluorescence imaging system and da Rc Xi  - Block, transversus abdominis plane, bilateral    Surgeon: Dr. Navarrete      Subjective:   Nausea: no, Vomiting: no, Ambulating: yes, Flatus: no  Pain Assessment: Patient is complaining of mild, generalized abdominal pain that is appropriate for post-operative course.     Objective:  Vitals: T(F): 96.5 (09-26-23 @ 20:51), Max: 98.5 (09-26-23 @ 15:01)  HR: 91 (09-26-23 @ 20:51)  BP: 124/71 (09-26-23 @ 20:51) (112/64 - 151/80)  RR: 18 (09-26-23 @ 20:51)  SpO2: 95% (09-26-23 @ 20:51)  Vent Settings:     In:   09-25-23 @ 07:01  -  09-26-23 @ 07:00  --------------------------------------------------------  IN: 1600 mL    09-26-23 @ 07:01  -  09-26-23 @ 23:02  --------------------------------------------------------  IN: 387.5 mL      IV Fluids: sodium chloride 0.9%. 1000 milliLiter(s) (100 mL/Hr) IV Continuous <Continuous>      Out:   09-25-23 @ 07:01  -  09-26-23 @ 07:00  --------------------------------------------------------  OUT: 0 mL    09-26-23 @ 07:01  -  09-26-23 @ 23:02  --------------------------------------------------------  OUT: 620 mL      EBL:     Voided Urine:   09-25-23 @ 07:01  -  09-26-23 @ 07:00  --------------------------------------------------------  OUT: 0 mL    09-26-23 @ 07:01  -  09-26-23 @ 23:02  --------------------------------------------------------  OUT: 620 mL      Chairez Catheter: no       Physical Examination:  General Appearance: NAD, sitting at side of bed  HEENT: EOMI, sclera non-icteric.  Heart: RRR  Lungs: CTABL  Abdomen:  Soft, mild TTP at surgical site, appropriate for post-op course, nondistended. No rigidity, guarding, or rebound tenderness.   MSK/Extremities: Warm & well-perfused. Peripheral pulses intact.  Skin: Warm, dry. No jaundice.   Incisions/Wounds: Dressings in place, clean, dry and intact, no signs of infection/active bleeding/drainage    Medications: [Standing]  albuterol    90 MICROgram(s) HFA Inhaler 2 Puff(s) Inhalation every 6 hours PRN  albuterol/ipratropium for Nebulization 3 milliLiter(s) Nebulizer every 6 hours PRN  aluminum hydroxide/magnesium hydroxide/simethicone Suspension 30 milliLiter(s) Oral every 4 hours PRN  aspirin enteric coated 81 milliGRAM(s) Oral daily  atorvastatin 10 milliGRAM(s) Oral at bedtime  ciprofloxacin   IVPB 400 milliGRAM(s) IV Intermittent every 12 hours  influenza   Vaccine 0.5 milliLiter(s) IntraMuscular once  melatonin 3 milliGRAM(s) Oral at bedtime PRN  metoprolol succinate ER 25 milliGRAM(s) Oral daily  metroNIDAZOLE  IVPB 500 milliGRAM(s) IV Intermittent every 8 hours  morphine  - Injectable 2 milliGRAM(s) IV Push every 6 hours PRN  ondansetron Injectable 4 milliGRAM(s) IV Push every 8 hours PRN  polyethylene glycol 3350 17 Gram(s) Oral daily  senna 2 Tablet(s) Oral at bedtime  sodium chloride 0.9%. 1000 milliLiter(s) IV Continuous <Continuous>    Medications: [PRN]  albuterol    90 MICROgram(s) HFA Inhaler 2 Puff(s) Inhalation every 6 hours PRN  albuterol/ipratropium for Nebulization 3 milliLiter(s) Nebulizer every 6 hours PRN  aluminum hydroxide/magnesium hydroxide/simethicone Suspension 30 milliLiter(s) Oral every 4 hours PRN  aspirin enteric coated 81 milliGRAM(s) Oral daily  atorvastatin 10 milliGRAM(s) Oral at bedtime  ciprofloxacin   IVPB 400 milliGRAM(s) IV Intermittent every 12 hours  influenza   Vaccine 0.5 milliLiter(s) IntraMuscular once  melatonin 3 milliGRAM(s) Oral at bedtime PRN  metoprolol succinate ER 25 milliGRAM(s) Oral daily  metroNIDAZOLE  IVPB 500 milliGRAM(s) IV Intermittent every 8 hours  morphine  - Injectable 2 milliGRAM(s) IV Push every 6 hours PRN  ondansetron Injectable 4 milliGRAM(s) IV Push every 8 hours PRN  polyethylene glycol 3350 17 Gram(s) Oral daily  senna 2 Tablet(s) Oral at bedtime  sodium chloride 0.9%. 1000 milliLiter(s) IV Continuous <Continuous>      ANTIBIOTICS:  ciprofloxacin   IVPB 400 milliGRAM(s) + metroNIDAZOLE  IVPB 500 milliGRAM(s)        Labs:                        15.3   9.79  )-----------( 294      ( 25 Sep 2023 21:05 )             45.7     09-25    140  |  100  |  21<H>  ----------------------------<  140<H>  4.5   |  30  |  1.1    Ca    9.5      25 Sep 2023 21:05  Phos  3.0     09-25  Mg     2.4     09-25    TPro  7.3  /  Alb  4.6  /  TBili  0.5  /  DBili  x   /  AST  95<H>  /  ALT  155<H>  /  AlkPhos  68  09-25    PT/INR - ( 25 Sep 2023 06:07 )   PT: 11.10 sec;   INR: 0.97 ratio         PTT - ( 25 Sep 2023 06:07 )  PTT:29.0 sec        Imaging:  No post-op imaging studies    ----------------------------------  63M w/ choledocholithiasis S/P robotic-assisted laparoscopic cholecystectomy.      Plan:  - Monitor vitals  - Monitor post-op labs and replete as necessary  - Monitor for bowel function  - Continue Pain Medications if necessary  - Continue IV Antibiotics --> will discharge on Augmentin BID for total 7 days    - Encourage ambulation as tolerated  - Monitor urine output  - Monitor wound and dressing for changes, redress as needed.  - Plan to discharge home tomorrow       Date/Time: 09-26-23 @ 23:02    Surgery - Blue Team  [phone]

## 2023-09-26 NOTE — CHART NOTE - NSCHARTNOTEFT_GEN_A_CORE
PACU ANESTHESIA ADMISSION NOTE      Procedure: Robot-assisted laparoscopic cholecystectomy using indocyanine green (ICG) fluorescence imaging system and da Rc Xi    Post op diagnosis:  Acute cholecystitis      __x__  Patent Airway    __x__  Full return of protective reflexes    x____  Full recovery from anesthesia / back to baseline     Vitals:   T: 97.6          R:   14              BP:        151/80          Sat:     99%              P: 79      Mental Status:  _x___ Awake   _____ Alert   _____ Drowsy   _____ Sedated    Nausea/Vomiting:  ____ NO  ______Yes,   See Post - Op Orders          Pain Scale (0-10):  _____    Treatment: ____ None    ___x_ See Post - Op/PCA Orders    Post - Operative Fluids:   ____ Oral   __x__ See Post - Op Orders    Plan: Discharge:   ___x_Home       _____Floor     _____Critical Care    _____  Other:_________________    Comments: Pt tolerated procedure well, no anesthesia related complications. Care of pt endorsed to PACU, report given to PACU RN. Discharge when criteria are met.

## 2023-09-26 NOTE — BRIEF OPERATIVE NOTE - OPERATION/FINDINGS
Acute cholecystitis with extensive omental adhesions. 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 CVS obtained, cystic duct clipped x3 and sharply divided. Some spillage of gallbladder contents, copious irrigation. Hemostasis achieved.

## 2023-09-26 NOTE — DISCHARGE NOTE NURSING/CASE MANAGEMENT/SOCIAL WORK - NSDCPEFALRISK_GEN_ALL_CORE
For information on Fall & Injury Prevention, visit: [email or web address] OR  [email or web address] OR  https://www.cdc.gov/steadi/patient.html

## 2023-09-26 NOTE — DISCHARGE NOTE NURSING/CASE MANAGEMENT/SOCIAL WORK - PATIENT PORTAL LINK FT
You can access the FollowMyHealth Patient Portal offered by [facility] by registering at the following website: [email or web address]. By joining [employer]’s FollowMyHealth portal, you will also be able to view your health information using other applications (apps) compatible with our system. [id number]

## 2023-09-27 ENCOUNTER — TRANSCRIPTION ENCOUNTER (OUTPATIENT)
Age: 63
End: 2023-09-27

## 2023-09-27 VITALS
SYSTOLIC BLOOD PRESSURE: 144 MMHG | HEART RATE: 67 BPM | DIASTOLIC BLOOD PRESSURE: 85 MMHG | RESPIRATION RATE: 18 BRPM | TEMPERATURE: 97 F | OXYGEN SATURATION: 95 %

## 2023-09-27 LAB
ALBUMIN SERPL ELPH-MCNC: 4.3 G/DL — SIGNIFICANT CHANGE UP (ref 3.5–5.2)
ALBUMIN SERPL ELPH-MCNC: 4.8 G/DL — SIGNIFICANT CHANGE UP (ref 3.5–5.2)
ALP SERPL-CCNC: 63 U/L — SIGNIFICANT CHANGE UP (ref 30–115)
ALP SERPL-CCNC: 69 U/L — SIGNIFICANT CHANGE UP (ref 30–115)
ALT FLD-CCNC: 107 U/L — HIGH (ref 0–41)
ALT FLD-CCNC: 120 U/L — HIGH (ref 0–41)
ANION GAP SERPL CALC-SCNC: 13 MMOL/L — SIGNIFICANT CHANGE UP (ref 7–14)
ANION GAP SERPL CALC-SCNC: 14 MMOL/L — SIGNIFICANT CHANGE UP (ref 7–14)
AST SERPL-CCNC: 104 U/L — HIGH (ref 0–41)
AST SERPL-CCNC: 113 U/L — HIGH (ref 0–41)
BASOPHILS # BLD AUTO: 0 K/UL — SIGNIFICANT CHANGE UP (ref 0–0.2)
BASOPHILS # BLD AUTO: 0.01 K/UL — SIGNIFICANT CHANGE UP (ref 0–0.2)
BASOPHILS NFR BLD AUTO: 0 % — SIGNIFICANT CHANGE UP (ref 0–1)
BASOPHILS NFR BLD AUTO: 0.1 % — SIGNIFICANT CHANGE UP (ref 0–1)
BILIRUB DIRECT SERPL-MCNC: 0.2 MG/DL — SIGNIFICANT CHANGE UP (ref 0–0.3)
BILIRUB INDIRECT FLD-MCNC: 0.4 MG/DL — SIGNIFICANT CHANGE UP (ref 0.2–1.2)
BILIRUB SERPL-MCNC: 0.6 MG/DL — SIGNIFICANT CHANGE UP (ref 0.2–1.2)
BILIRUB SERPL-MCNC: 0.7 MG/DL — SIGNIFICANT CHANGE UP (ref 0.2–1.2)
BUN SERPL-MCNC: 23 MG/DL — HIGH (ref 10–20)
BUN SERPL-MCNC: 23 MG/DL — HIGH (ref 10–20)
CALCIUM SERPL-MCNC: 9.1 MG/DL — SIGNIFICANT CHANGE UP (ref 8.4–10.5)
CALCIUM SERPL-MCNC: 9.7 MG/DL — SIGNIFICANT CHANGE UP (ref 8.4–10.5)
CHLORIDE SERPL-SCNC: 100 MMOL/L — SIGNIFICANT CHANGE UP (ref 98–110)
CHLORIDE SERPL-SCNC: 100 MMOL/L — SIGNIFICANT CHANGE UP (ref 98–110)
CO2 SERPL-SCNC: 22 MMOL/L — SIGNIFICANT CHANGE UP (ref 17–32)
CO2 SERPL-SCNC: 25 MMOL/L — SIGNIFICANT CHANGE UP (ref 17–32)
CREAT SERPL-MCNC: 1.1 MG/DL — SIGNIFICANT CHANGE UP (ref 0.7–1.5)
CREAT SERPL-MCNC: 1.2 MG/DL — SIGNIFICANT CHANGE UP (ref 0.7–1.5)
EGFR: 68 ML/MIN/1.73M2 — SIGNIFICANT CHANGE UP
EGFR: 75 ML/MIN/1.73M2 — SIGNIFICANT CHANGE UP
EOSINOPHIL # BLD AUTO: 0 K/UL — SIGNIFICANT CHANGE UP (ref 0–0.7)
EOSINOPHIL # BLD AUTO: 0 K/UL — SIGNIFICANT CHANGE UP (ref 0–0.7)
EOSINOPHIL NFR BLD AUTO: 0 % — SIGNIFICANT CHANGE UP (ref 0–8)
EOSINOPHIL NFR BLD AUTO: 0 % — SIGNIFICANT CHANGE UP (ref 0–8)
GLUCOSE SERPL-MCNC: 110 MG/DL — HIGH (ref 70–99)
GLUCOSE SERPL-MCNC: 125 MG/DL — HIGH (ref 70–99)
HCT VFR BLD CALC: 41.6 % — LOW (ref 42–52)
HCT VFR BLD CALC: 45.6 % — SIGNIFICANT CHANGE UP (ref 42–52)
HGB BLD-MCNC: 13.7 G/DL — LOW (ref 14–18)
HGB BLD-MCNC: 15.1 G/DL — SIGNIFICANT CHANGE UP (ref 14–18)
IMM GRANULOCYTES NFR BLD AUTO: 0.1 % — SIGNIFICANT CHANGE UP (ref 0.1–0.3)
IMM GRANULOCYTES NFR BLD AUTO: 0.2 % — SIGNIFICANT CHANGE UP (ref 0.1–0.3)
LYMPHOCYTES # BLD AUTO: 0.72 K/UL — LOW (ref 1.2–3.4)
LYMPHOCYTES # BLD AUTO: 0.99 K/UL — LOW (ref 1.2–3.4)
LYMPHOCYTES # BLD AUTO: 11.1 % — LOW (ref 20.5–51.1)
LYMPHOCYTES # BLD AUTO: 9.5 % — LOW (ref 20.5–51.1)
MAGNESIUM SERPL-MCNC: 2 MG/DL — SIGNIFICANT CHANGE UP (ref 1.8–2.4)
MAGNESIUM SERPL-MCNC: 2.2 MG/DL — SIGNIFICANT CHANGE UP (ref 1.8–2.4)
MCHC RBC-ENTMCNC: 29.7 PG — SIGNIFICANT CHANGE UP (ref 27–31)
MCHC RBC-ENTMCNC: 30 PG — SIGNIFICANT CHANGE UP (ref 27–31)
MCHC RBC-ENTMCNC: 32.9 G/DL — SIGNIFICANT CHANGE UP (ref 32–37)
MCHC RBC-ENTMCNC: 33.1 G/DL — SIGNIFICANT CHANGE UP (ref 32–37)
MCV RBC AUTO: 90.2 FL — SIGNIFICANT CHANGE UP (ref 80–94)
MCV RBC AUTO: 90.7 FL — SIGNIFICANT CHANGE UP (ref 80–94)
MONOCYTES # BLD AUTO: 0.33 K/UL — SIGNIFICANT CHANGE UP (ref 0.1–0.6)
MONOCYTES # BLD AUTO: 0.72 K/UL — HIGH (ref 0.1–0.6)
MONOCYTES NFR BLD AUTO: 4.4 % — SIGNIFICANT CHANGE UP (ref 1.7–9.3)
MONOCYTES NFR BLD AUTO: 8.1 % — SIGNIFICANT CHANGE UP (ref 1.7–9.3)
NEUTROPHILS # BLD AUTO: 6.52 K/UL — HIGH (ref 1.4–6.5)
NEUTROPHILS # BLD AUTO: 7.18 K/UL — HIGH (ref 1.4–6.5)
NEUTROPHILS NFR BLD AUTO: 80.5 % — HIGH (ref 42.2–75.2)
NEUTROPHILS NFR BLD AUTO: 86 % — HIGH (ref 42.2–75.2)
NRBC # BLD: 0 /100 WBCS — SIGNIFICANT CHANGE UP (ref 0–0)
NRBC # BLD: 0 /100 WBCS — SIGNIFICANT CHANGE UP (ref 0–0)
PHOSPHATE SERPL-MCNC: 5.9 MG/DL — HIGH (ref 2.1–4.9)
PLATELET # BLD AUTO: 235 K/UL — SIGNIFICANT CHANGE UP (ref 130–400)
PLATELET # BLD AUTO: 276 K/UL — SIGNIFICANT CHANGE UP (ref 130–400)
PMV BLD: 8.8 FL — SIGNIFICANT CHANGE UP (ref 7.4–10.4)
PMV BLD: 9.3 FL — SIGNIFICANT CHANGE UP (ref 7.4–10.4)
POTASSIUM SERPL-MCNC: 5 MMOL/L — SIGNIFICANT CHANGE UP (ref 3.5–5)
POTASSIUM SERPL-MCNC: 5.3 MMOL/L — HIGH (ref 3.5–5)
POTASSIUM SERPL-SCNC: 5 MMOL/L — SIGNIFICANT CHANGE UP (ref 3.5–5)
POTASSIUM SERPL-SCNC: 5.3 MMOL/L — HIGH (ref 3.5–5)
PROT SERPL-MCNC: 6.6 G/DL — SIGNIFICANT CHANGE UP (ref 6–8)
PROT SERPL-MCNC: 7.5 G/DL — SIGNIFICANT CHANGE UP (ref 6–8)
RBC # BLD: 4.61 M/UL — LOW (ref 4.7–6.1)
RBC # BLD: 5.03 M/UL — SIGNIFICANT CHANGE UP (ref 4.7–6.1)
RBC # FLD: 12.6 % — SIGNIFICANT CHANGE UP (ref 11.5–14.5)
RBC # FLD: 12.6 % — SIGNIFICANT CHANGE UP (ref 11.5–14.5)
SODIUM SERPL-SCNC: 135 MMOL/L — SIGNIFICANT CHANGE UP (ref 135–146)
SODIUM SERPL-SCNC: 139 MMOL/L — SIGNIFICANT CHANGE UP (ref 135–146)
WBC # BLD: 7.58 K/UL — SIGNIFICANT CHANGE UP (ref 4.8–10.8)
WBC # BLD: 8.92 K/UL — SIGNIFICANT CHANGE UP (ref 4.8–10.8)
WBC # FLD AUTO: 7.58 K/UL — SIGNIFICANT CHANGE UP (ref 4.8–10.8)
WBC # FLD AUTO: 8.92 K/UL — SIGNIFICANT CHANGE UP (ref 4.8–10.8)

## 2023-09-27 PROCEDURE — 99232 SBSQ HOSP IP/OBS MODERATE 35: CPT

## 2023-09-27 RX ORDER — METRONIDAZOLE 500 MG
1 TABLET ORAL
Qty: 21 | Refills: 0
Start: 2023-09-27 | End: 2023-10-03

## 2023-09-27 RX ORDER — SODIUM ZIRCONIUM CYCLOSILICATE 10 G/10G
5 POWDER, FOR SUSPENSION ORAL ONCE
Refills: 0 | Status: COMPLETED | OUTPATIENT
Start: 2023-09-27 | End: 2023-09-27

## 2023-09-27 RX ORDER — CIPROFLOXACIN LACTATE 400MG/40ML
1 VIAL (ML) INTRAVENOUS
Qty: 14 | Refills: 0
Start: 2023-09-27 | End: 2023-10-03

## 2023-09-27 RX ORDER — TAMSULOSIN HYDROCHLORIDE 0.4 MG/1
0.4 CAPSULE ORAL DAILY
Refills: 0 | Status: DISCONTINUED | OUTPATIENT
Start: 2023-09-27 | End: 2023-09-27

## 2023-09-27 RX ADMIN — TAMSULOSIN HYDROCHLORIDE 0.4 MILLIGRAM(S): 0.4 CAPSULE ORAL at 09:52

## 2023-09-27 RX ADMIN — Medication 25 MILLIGRAM(S): at 05:14

## 2023-09-27 RX ADMIN — Medication 200 MILLIGRAM(S): at 05:14

## 2023-09-27 RX ADMIN — Medication 81 MILLIGRAM(S): at 10:56

## 2023-09-27 RX ADMIN — Medication 100 MILLIGRAM(S): at 13:50

## 2023-09-27 RX ADMIN — Medication 100 MILLIGRAM(S): at 05:14

## 2023-09-27 RX ADMIN — SODIUM ZIRCONIUM CYCLOSILICATE 5 GRAM(S): 10 POWDER, FOR SUSPENSION ORAL at 15:04

## 2023-09-27 RX ADMIN — SODIUM CHLORIDE 100 MILLILITER(S): 9 INJECTION INTRAMUSCULAR; INTRAVENOUS; SUBCUTANEOUS at 05:14

## 2023-09-27 NOTE — DISCHARGE NOTE PROVIDER - NSDCCPCAREPLAN_GEN_ALL_CORE_FT
PRINCIPAL DISCHARGE DIAGNOSIS  Diagnosis: Abdominal pain  Assessment and Plan of Treatment: Follow up with your outpatient surgeon in 1-2 weeks. Please call to make an appointment. Please follow up your repeat CT scan appointment on 10/7/23.   S/P Laparoscopic cholecystectomy.   diet: low fat diet for initial 2-4 weeks and then can resume a regular diet with low fiber.   may shower and resume activity as tolerated. encourage ambulation.   get plenty of rest  Call your healthcare provider if you experience extrreme fatigue (tiredness), pain around incision that doesnt go away or worsening, diarrhea, loss of appetite, fever, heart palpitations, yellowing of your eyes (jaundice), increased storm pain, rectal bleeding, shortness of breath, or leg swelling.   Please take your antibiotics for additional 7 days. Script sent to the [facility] on file. Do not consume alcohol while taking these antibiotics because it can reduce its effectiveness and induce vomiting.         SECONDARY DISCHARGE DIAGNOSES  Diagnosis: Elevated liver function tests  Assessment and Plan of Treatment:     Diagnosis: Gall stone  Assessment and Plan of Treatment:

## 2023-09-27 NOTE — PROVIDER CONTACT NOTE (OTHER) - SITUATION
pt voided  100 - then took flomax - pt voided  160 more -then pt was bladder scanned - bladder  scan  showed  550 cc  -
Pt w/ cholelithiasis, WBCs elevated and NPO pending ERCP. no abx ordered in AM and pt states he has a headache.

## 2023-09-27 NOTE — PROGRESS NOTE ADULT - SUBJECTIVE AND OBJECTIVE BOX
Patient is a pleasant 64 y/o male with a PMHx of HTN, asthma, hx R vertebral artery dissection who presented to the ED with abdominal pain. Patient was admitted under medicine team and had an ERCP done 9/25. ERCP was done with sphincterotomy and removal of sludge. Patient tolerated procedure well without any post procedural pain. He has no melena, nausea, emesis, nor current fever.       PAST MEDICAL & SURGICAL HISTORY:  Stroke  HTN (hypertension)  Asthma  Aneurysm of vertebral artery      Home Medications:  aspirin 81 mg oral delayed release tablet: 1 tab(s) orally once a day (24 Sep 2023 08:13)  Breo Ellipta 100 mcg-25 mcg/inh inhalation powder: 1 puff(s) inhaled once a day (24 Sep 2023 08:12)  Lipitor 10 mg oral tablet: 1 tab(s) orally once a day (24 Sep 2023 08:13)  metoprolol succinate 25 mg oral tablet, extended release: 1 tab(s) orally once a day (24 Sep 2023 08:12)  ProAir HFA 90 mcg/inh inhalation aerosol: 2 puff(s) inhaled 4 times a day, As Needed (24 Sep 2023 08:13)      MEDICATIONS  (STANDING):  aspirin enteric coated 81 milliGRAM(s) Oral daily  atorvastatin 10 milliGRAM(s) Oral at bedtime  enoxaparin Injectable 40 milliGRAM(s) SubCutaneous every 24 hours  influenza   Vaccine 0.5 milliLiter(s) IntraMuscular once  metoprolol succinate ER 25 milliGRAM(s) Oral daily  polyethylene glycol 3350 17 Gram(s) Oral daily  senna 2 Tablet(s) Oral at bedtime  sodium zirconium cyclosilicate 5 Gram(s) Oral once    MEDICATIONS  (PRN):  albuterol    90 MICROgram(s) HFA Inhaler 2 Puff(s) Inhalation every 6 hours PRN Shortness of Breath and/or Wheezing  albuterol/ipratropium for Nebulization 3 milliLiter(s) Nebulizer every 6 hours PRN Shortness of Breath and/or Wheezing  aluminum hydroxide/magnesium hydroxide/simethicone Suspension 30 milliLiter(s) Oral every 4 hours PRN Dyspepsia  melatonin 3 milliGRAM(s) Oral at bedtime PRN Insomnia  morphine  - Injectable 2 milliGRAM(s) IV Push every 6 hours PRN Pain 4-10  ondansetron Injectable 4 milliGRAM(s) IV Push every 8 hours PRN Nausea and/or Vomiting      Allergies  Seafood (Urticaria)  No Known Drug Allergies      Review of Systems:   Constitutional:  No Fever, No Chills  ENT/Mouth:  No Hearing Changes,  No Difficulty Swallowing  Eyes:  No Eye Pain, No Vision Changes  Cardiovascular:  No Chest Pain, No Palpitations  Respiratory:  No Cough, No Dyspnea  Gastrointestinal:  As described in HPI  Musculoskeletal:  No Joint Swelling, No Back Pain  Skin:  No Skin Lesions, No Jaundice  Neuro:  No Syncope, No Dizziness  Heme/Lymph:  No Bruising, No Bleeding.    Vital Signs Last 24 Hrs  T(C): 36.1 (26 Sep 2023 05:30), Max: 36.6 (26 Sep 2023 02:08)  T(F): 97 (26 Sep 2023 05:30), Max: 97.8 (26 Sep 2023 02:08)  HR: 53 (26 Sep 2023 05:30) (53 - 62)  BP: 112/64 (26 Sep 2023 05:30) (112/64 - 159/91)  BP(mean): --  RR: 18 (26 Sep 2023 02:08) (16 - 60)  SpO2: 97% (26 Sep 2023 05:30) (95% - 98%)      GENERAL: AAOx3, no acute distress.  HEAD:  Atraumatic, Normocephalic  EYES: conjunctiva and sclera clear  NECK: Supple, no JVD or thyromegaly  CHEST/LUNG: Clear to auscultation bilaterally; No wheeze, rhonchi, or rales  HEART: Regular rate and rhythm; normal S1, S2, No murmurs.  ABDOMEN: Soft, nontender, nondistended; Bowel sounds present  NEUROLOGY: No asterixis or tremor.   SKIN: Intact, no jaundice    Labs:                        15.3   9.79  )-----------( 294      ( 25 Sep 2023 21:05 )             45.7     09-25    140  |  100  |  21<H>  ----------------------------<  140<H>  4.5   |  30  |  1.1    Ca    9.5      25 Sep 2023 21:05  Phos  3.0     09-25  Mg     2.4     09-25    TPro  7.3  /  Alb  4.6  /  TBili  0.5  /  DBili  x   /  AST  95<H>  /  ALT  155<H>  /  AlkPhos  68  09-25  
GENERAL SURGERY PROGRESS NOTE     ZHANNA HART  63y  Male  Hospital day :2d  POD:  Procedure:   OVERNIGHT EVENTS:  NAEO. pt s/p ERCP w/ sphincterotomy, biliary sludge removal, livan CLD    T(F): 97.4 (09-25-23 @ 18:35), Max: 97.7 (09-25-23 @ 08:42)  HR: 54 (09-26-23 @ 02:08) (54 - 62)  BP: 120/75 (09-26-23 @ 02:08) (120/64 - 159/91)  ABP: --  ABP(mean): --  RR: 18 (09-26-23 @ 02:08) (16 - 60)  SpO2: 97% (09-26-23 @ 02:08) (95% - 98%)    DIET/FLUIDS: sodium chloride 0.9%. 1000 milliLiter(s) IV Continuous <Continuous>    NG:                                                                                DRAINS:     BM:     EMESIS:     URINE:      GI proph:    AC/ proph: aspirin enteric coated 81 milliGRAM(s) Oral daily    ABx: ciprofloxacin   IVPB 400 milliGRAM(s) IV Intermittent every 12 hours  ciprofloxacin   IVPB      metroNIDAZOLE  IVPB      metroNIDAZOLE  IVPB 500 milliGRAM(s) IV Intermittent every 8 hours      PHYSICAL EXAM:  GENERAL: NAD, well-appearing  CHEST/LUNG: Clear to auscultation bilaterally  HEART: Regular rate and rhythm  ABDOMEN: Soft, mildly tender tender, Nondistended;   EXTREMITIES:  No clubbing, cyanosis, or edema      LABS  Labs:  CAPILLARY BLOOD GLUCOSE                              15.3   9.79  )-----------( 294      ( 25 Sep 2023 21:05 )             45.7       Auto Neutrophil %: 75.4 % (09-25-23 @ 06:07)  Auto Immature Granulocyte %: 0.4 % (09-25-23 @ 06:07)    09-25    140  |  100  |  21<H>  ----------------------------<  140<H>  4.5   |  30  |  1.1      Calcium: 9.5 mg/dL (09-25-23 @ 21:05)      LFTs:             7.3  | 0.5  | 95       ------------------[68      ( 25 Sep 2023 21:05 )  4.6  | x    | 155         Lipase:x      Amylase:x         Lactate, Blood: 1.8 mmol/L (09-23-23 @ 23:27)      Coags:     11.10  ----< 0.97    ( 25 Sep 2023 06:07 )     29.0                Urinalysis Basic - ( 25 Sep 2023 21:05 )    Color: x / Appearance: x / SG: x / pH: x  Gluc: 140 mg/dL / Ketone: x  / Bili: x / Urobili: x   Blood: x / Protein: x / Nitrite: x   Leuk Esterase: x / RBC: x / WBC x   Sq Epi: x / Non Sq Epi: x / Bacteria: x        Culture - Urine (collected 24 Sep 2023 00:16)  Source: Clean Catch Clean Catch (Midstream)  Final Report (25 Sep 2023 12:05):    <10,000 CFU/mL Normal Urogenital Alejandra          RADIOLOGY & ADDITIONAL TESTS:    no new imaging        
  ZHANNA HART  63y Male    INTERVAL HPI/OVERNIGHT EVENTS:    patient seen at bedside. family present at bedside as well  patient doing well but now retaining urine around 600 ml  bladder cath was done.   no abdominal pain     Vital Signs Last 24 Hrs  T(C): 36.2 (27 Sep 2023 12:53), Max: 36.9 (26 Sep 2023 15:01)  T(F): 97.1 (27 Sep 2023 12:53), Max: 98.5 (26 Sep 2023 15:01)  HR: 67 (27 Sep 2023 12:53) (63 - 91)  BP: 144/85 (27 Sep 2023 12:53) (120/71 - 151/80)  BP(mean): --  RR: 18 (27 Sep 2023 12:53) (13 - 21)  SpO2: 95% (27 Sep 2023 12:53) (93% - 99%)    Parameters below as of 26 Sep 2023 20:00  Patient On (Oxygen Delivery Method): nasal cannula  O2 Flow (L/min): 2        PHYSICAL EXAM:  GENERAL: NAD  HEAD:  Normocephalic  EYES:  conjunctiva and sclera clear  ENMT: Moist mucous membranes  NERVOUS SYSTEM:  Alert, awake, Good concentration  CHEST/LUNG: CTA b/l  HEART: Regular rate and rhythm  ABDOMEN: Soft, Nontender, Nondistended; Bowel sounds present  EXTREMITIES:   No edema  SKIN: warm, dry    Consultant(s) Notes Reviewed:  [x ] YES  [ ] NO  Care Discussed with Consultants/Other Providers [ x] YES  [ ] NO    MEDICATIONS  (STANDING):  aspirin enteric coated 81 milliGRAM(s) Oral daily  atorvastatin 10 milliGRAM(s) Oral at bedtime  ciprofloxacin   IVPB 400 milliGRAM(s) IV Intermittent every 12 hours  ciprofloxacin   IVPB      influenza   Vaccine 0.5 milliLiter(s) IntraMuscular once  metoprolol succinate ER 25 milliGRAM(s) Oral daily  metroNIDAZOLE  IVPB 500 milliGRAM(s) IV Intermittent every 8 hours  metroNIDAZOLE  IVPB      polyethylene glycol 3350 17 Gram(s) Oral daily  senna 2 Tablet(s) Oral at bedtime  sodium chloride 0.9%. 1000 milliLiter(s) (100 mL/Hr) IV Continuous <Continuous>    MEDICATIONS  (PRN):  albuterol    90 MICROgram(s) HFA Inhaler 2 Puff(s) Inhalation every 6 hours PRN Shortness of Breath and/or Wheezing  albuterol/ipratropium for Nebulization 3 milliLiter(s) Nebulizer every 6 hours PRN Shortness of Breath and/or Wheezing  aluminum hydroxide/magnesium hydroxide/simethicone Suspension 30 milliLiter(s) Oral every 4 hours PRN Dyspepsia  melatonin 3 milliGRAM(s) Oral at bedtime PRN Insomnia  morphine  - Injectable 2 milliGRAM(s) IV Push every 6 hours PRN Pain 4-10  ondansetron Injectable 4 milliGRAM(s) IV Push every 8 hours PRN Nausea and/or Vomiting      LABS:      LABS:                        15.1   8.92  )-----------( 276      ( 27 Sep 2023 05:15 )             45.6     09-27    139  |  100  |  23<H>  ----------------------------<  110<H>  5.3<H>   |  25  |  1.2    Ca    9.7      27 Sep 2023 05:15  Phos  5.9     09-27  Mg     2.2     09-27    TPro  7.5  /  Alb  4.8  /  TBili  0.7  /  DBili  x   /  AST  113<H>  /  ALT  120<H>  /  AlkPhos  69  09-27          PT/INR - ( 25 Sep 2023 06:07 )   PT: 11.10 sec;   INR: 0.97 ratio         PTT - ( 25 Sep 2023 06:07 )  PTT:29.0 sec    Culture - Urine (collected 24 Sep 2023 00:16)  Source: Clean Catch Clean Catch (Midstream)  Final Report (25 Sep 2023 12:05):    <10,000 CFU/mL Normal Urogenital Alejandra        RADIOLOGY & ADDITIONAL TESTS:    Imaging or report Personally Reviewed:  [x ] YES  [ ] NO        Case discussed with resident on rounds today    Care discussed with pt        
  ZHANNA HART  63y Male    INTERVAL HPI/OVERNIGHT EVENTS:    pt feels well  no abdominal pain, N/V, fever  ambulating in the room  NPO since midnight    T(F): 97.7 (09-25-23 @ 08:42), Max: 97.8 (09-24-23 @ 12:24)  HR: 57 (09-25-23 @ 08:42) (57 - 72)  BP: 128/81 (09-25-23 @ 08:42) (120/64 - 144/81)  RR: 18 (09-25-23 @ 08:42) (18 - 18)  SpO2: 96% (09-25-23 @ 08:42) (96% - 97%)  I&O's Summary    24 Sep 2023 07:01  -  25 Sep 2023 07:00  --------------------------------------------------------  IN: 240 mL / OUT: 0 mL / NET: 240 mL    PHYSICAL EXAM:  GENERAL: NAD  HEAD:  Normocephalic  EYES:  conjunctiva and sclera clear  ENMT: Moist mucous membranes  NERVOUS SYSTEM:  Alert, awake, Good concentration  CHEST/LUNG: CTA b/l  HEART: Regular rate and rhythm  ABDOMEN: Soft, Nontender, Nondistended; Bowel sounds present  EXTREMITIES:   No edema  SKIN: warm, dry    Consultant(s) Notes Reviewed:  [x ] YES  [ ] NO  Care Discussed with Consultants/Other Providers [ x] YES  [ ] NO    MEDICATIONS  (STANDING):  aspirin enteric coated 81 milliGRAM(s) Oral daily  atorvastatin 10 milliGRAM(s) Oral at bedtime  enoxaparin Injectable 40 milliGRAM(s) SubCutaneous every 24 hours  influenza   Vaccine 0.5 milliLiter(s) IntraMuscular once  metoprolol succinate ER 25 milliGRAM(s) Oral daily  polyethylene glycol 3350 17 Gram(s) Oral daily  senna 2 Tablet(s) Oral at bedtime    MEDICATIONS  (PRN):  albuterol    90 MICROgram(s) HFA Inhaler 2 Puff(s) Inhalation every 6 hours PRN Shortness of Breath and/or Wheezing  albuterol/ipratropium for Nebulization 3 milliLiter(s) Nebulizer every 6 hours PRN Shortness of Breath and/or Wheezing  aluminum hydroxide/magnesium hydroxide/simethicone Suspension 30 milliLiter(s) Oral every 4 hours PRN Dyspepsia  melatonin 3 milliGRAM(s) Oral at bedtime PRN Insomnia  morphine  - Injectable 2 milliGRAM(s) IV Push every 6 hours PRN Pain 4-10  ondansetron Injectable 4 milliGRAM(s) IV Push every 8 hours PRN Nausea and/or Vomiting      LABS:                        13.7   13.80 )-----------( 277      ( 25 Sep 2023 06:07 )             41.4     09-25    138  |  101  |  21<H>  ----------------------------<  101<H>  4.6   |  30  |  1.1    Ca    9.5      25 Sep 2023 06:07  Mg     2.4     09-25    TPro  6.5  /  Alb  3.9  /  TBili  0.5  /  DBili  x   /  AST  104<H>  /  ALT  168<H>  /  AlkPhos  69  09-25    PT/INR - ( 25 Sep 2023 06:07 )   PT: 11.10 sec;   INR: 0.97 ratio         PTT - ( 25 Sep 2023 06:07 )  PTT:29.0 sec      RADIOLOGY & ADDITIONAL TESTS:    Imaging or report Personally Reviewed:  [x ] YES  [ ] NO    < from: US Abdomen Upper Quadrant Right (09.24.23 @ 04:00) >  IMPRESSION:  Distended gallbladder with cholelithiasis. No abnormal gallbladder wall   thickening. Negative reported sonographic Horn sign.  Extra hepatic biliary ducts better evaluated on recent CT.    < end of copied text >    < from: CT Angio Abdomen and Pelvis w/ IV Cont (09.24.23 @ 02:19) >  IMPRESSION:    1. Mild dilation of common bile duct up to 0.7 cm with distal CBD 0.4 cm   choledocholith; correlation with LFTs suggested.    2. Cholelithiasis.    3. No evidence of acute aortic pathology.    < end of copied text >      < from: CT Head No Cont (09.24.23 @ 01:42) >  IMPRESSION:    No evidence of acute intracranial pathology.    < end of copied text >      < from: Xray Chest 1 View- PORTABLE-Urgent (09.24.23 @ 00:29) >  IMPRESSION:    No radiographic evidence of acute cardiopulmonary disease.    < end of copied text >      < from: 12 Lead ECG (09.24.23 @ 04:29) >  Diagnosis Line Sinus bradycardia  Otherwise normal ECG    < end of copied text >      Case discussed with resident today    Care discussed with pt      
  ZHANNA HART  63y Male    INTERVAL HPI/OVERNIGHT EVENTS:    pt feels well and seems agreeable for surgery  no fever, vomiting, nausea, abdominal pain    T(F): 97.1 (09-26-23 @ 12:18), Max: 97.8 (09-26-23 @ 02:08)  HR: 57 (09-26-23 @ 12:18) (53 - 62)  BP: 121/78 (09-26-23 @ 12:18) (112/64 - 159/91)  RR: 18 (09-26-23 @ 12:18) (16 - 60)  SpO2: 96% (09-26-23 @ 12:18) (95% - 98%)  I&O's Summary    25 Sep 2023 07:01  -  26 Sep 2023 07:00  --------------------------------------------------------  IN: 1600 mL / OUT: 0 mL / NET: 1600 mL      PHYSICAL EXAM:  GENERAL: NAD  HEAD:  Normocephalic  EYES:  conjunctiva and sclera clear  ENMT: Moist mucous membranes  NERVOUS SYSTEM:  Alert, awake, Good concentration  CHEST/LUNG: CTA b/l  HEART: Regular rate and rhythm  ABDOMEN: Soft, Nontender, Nondistended; Bowel sounds present  EXTREMITIES:   No edema  SKIN: warm, dry    Consultant(s) Notes Reviewed:  [x ] YES  [ ] NO  Care Discussed with Consultants/Other Providers [ x] YES  [ ] NO    MEDICATIONS  (STANDING):  aspirin enteric coated 81 milliGRAM(s) Oral daily  atorvastatin 10 milliGRAM(s) Oral at bedtime  ciprofloxacin   IVPB 400 milliGRAM(s) IV Intermittent every 12 hours  ciprofloxacin   IVPB      influenza   Vaccine 0.5 milliLiter(s) IntraMuscular once  metoprolol succinate ER 25 milliGRAM(s) Oral daily  metroNIDAZOLE  IVPB 500 milliGRAM(s) IV Intermittent every 8 hours  metroNIDAZOLE  IVPB      polyethylene glycol 3350 17 Gram(s) Oral daily  senna 2 Tablet(s) Oral at bedtime  sodium chloride 0.9%. 1000 milliLiter(s) (100 mL/Hr) IV Continuous <Continuous>    MEDICATIONS  (PRN):  albuterol    90 MICROgram(s) HFA Inhaler 2 Puff(s) Inhalation every 6 hours PRN Shortness of Breath and/or Wheezing  albuterol/ipratropium for Nebulization 3 milliLiter(s) Nebulizer every 6 hours PRN Shortness of Breath and/or Wheezing  aluminum hydroxide/magnesium hydroxide/simethicone Suspension 30 milliLiter(s) Oral every 4 hours PRN Dyspepsia  melatonin 3 milliGRAM(s) Oral at bedtime PRN Insomnia  morphine  - Injectable 2 milliGRAM(s) IV Push every 6 hours PRN Pain 4-10  ondansetron Injectable 4 milliGRAM(s) IV Push every 8 hours PRN Nausea and/or Vomiting      LABS:                        15.3   9.79  )-----------( 294      ( 25 Sep 2023 21:05 )             45.7     09-25    140  |  100  |  21<H>  ----------------------------<  140<H>  4.5   |  30  |  1.1    Ca    9.5      25 Sep 2023 21:05  Phos  3.0     09-25  Mg     2.4     09-25    TPro  7.3  /  Alb  4.6  /  TBili  0.5  /  DBili  x   /  AST  95<H>  /  ALT  155<H>  /  AlkPhos  68  09-25    PT/INR - ( 25 Sep 2023 06:07 )   PT: 11.10 sec;   INR: 0.97 ratio         PTT - ( 25 Sep 2023 06:07 )  PTT:29.0 sec    Culture - Urine (collected 24 Sep 2023 00:16)  Source: Clean Catch Clean Catch (Midstream)  Final Report (25 Sep 2023 12:05):    <10,000 CFU/mL Normal Urogenital Alejandra        RADIOLOGY & ADDITIONAL TESTS:    Imaging or report Personally Reviewed:  [x ] YES  [ ] NO        Case discussed with resident on rounds today    Care discussed with pt        
GENERAL SURGERY PROGRESS NOTE    Patient: ZHANNA HART , 63y ([birth year])Male   MRN: [id number]  Location: [facility] 4C (Back) 020 A  Visit: 09-24-23 Inpatient  Date: 09-27-23 @ 01:50    Hospital Day #: 4d   Post-Op Day #: 1d     PROCEDURE/DX/INJURIES:   - Choledocholithiasis   - S/P ERCP w/ sphincterotomy (removal of sludge) (9/25)  - S/P robot-assisted laparoscopic cholecystectomy, B/L TAP block (9/26)     INTERVAL HX:   Naeon. Patient is doing well post-operatively. Pain well controlled. Ambulating without difficulty.       PAST MEDICAL & SURGICAL HISTORY:  ·	Stroke  ·	HTN (hypertension)  ·	Asthma  ·	Aneurysm of vertebral artery      VITALS:   T(F): 96.8 (09-27-23 @ 00:32), Max: 98.5 (09-26-23 @ 15:01)  HR: 73 (09-27-23 @ 00:32)  BP: 120/71 (09-27-23 @ 00:32)  RR: 18 (09-27-23 @ 00:32)  SpO2: 98% (09-27-23 @ 00:32)      DIET:   Diet, Regular:   Low Fat (LOWFAT) (09-26-23 @ 18:45) [Active]      FLUIDS: sodium chloride 0.9%.: Solution, 1000 milliLiter(s) infuse at 100 mL/Hr (09-26-23 @ 18:42)      I & O's:    09-25-23 @ 07:01  -  09-26-23 @ 07:00  --------------------------------------------------------  IN:    IV PiggyBack: 400 mL    sodium chloride 0.9%: 1200 mL  Total IN: 1600 mL    OUT:  Total OUT: 0 mL    Total NET: 1600 mL      09-26-23 @ 07:01  -  09-27-23 @ 01:50  --------------------------------------------------------  IN:    IV PiggyBack: 100 mL    Lactated Ringers: 137.5 mL    Oral Fluid: 50 mL    sodium chloride 0.9%: 400 mL  Total IN: 687.5 mL    OUT:    Voided (mL): 620 mL  Total OUT: 620 mL    Total NET: 67.5 mL      PHYSICAL EXAM:  General: NAD, AAOx3, calm and cooperative  HEENT: EOMI, sclera non-icteric.  Heart: RRR   Lungs: Breathing comfortably  Abdomen:  Soft, mild TTP at surgical site, nondistended.   MSK/Extremities: Warm & well-perfused.   Skin: Warm, dry. No jaundice.   Incision/wound: healing well, dressings in place, clean, dry and intact    MEDICATIONS  (STANDING):  aspirin enteric coated 81 milliGRAM(s) Oral daily  atorvastatin 10 milliGRAM(s) Oral at bedtime  ciprofloxacin   IVPB 400 milliGRAM(s) IV Intermittent every 12 hours  influenza   Vaccine 0.5 milliLiter(s) IntraMuscular once  metoprolol succinate ER 25 milliGRAM(s) Oral daily  metroNIDAZOLE  IVPB 500 milliGRAM(s) IV Intermittent every 8 hours  polyethylene glycol 3350 17 Gram(s) Oral daily  senna 2 Tablet(s) Oral at bedtime  sodium chloride 0.9%. 1000 milliLiter(s) (100 mL/Hr) IV Continuous <Continuous>    MEDICATIONS  (PRN):  albuterol    90 MICROgram(s) HFA Inhaler 2 Puff(s) Inhalation every 6 hours PRN Shortness of Breath and/or Wheezing  albuterol/ipratropium for Nebulization 3 milliLiter(s) Nebulizer every 6 hours PRN Shortness of Breath and/or Wheezing  aluminum hydroxide/magnesium hydroxide/simethicone Suspension 30 milliLiter(s) Oral every 4 hours PRN Dyspepsia  melatonin 3 milliGRAM(s) Oral at bedtime PRN Insomnia  morphine  - Injectable 2 milliGRAM(s) IV Push every 6 hours PRN Pain 4-10  ondansetron Injectable 4 milliGRAM(s) IV Push every 8 hours PRN Nausea and/or Vomiting      ANTIBIOTICS:  ciprofloxacin   IVPB 400 milliGRAM(s) + metroNIDAZOLE  IVPB 500 milliGRAM(s)          LAB/STUDIES:                        13.7   7.58  )-----------( 235      ( 27 Sep 2023 00:56 )             41.6       Auto Neutrophil %: 86.0 % (09-27-23 @ 00:56)  Auto Immature Granulocyte %: 0.1 % (09-27-23 @ 00:56)    09-27    135  |  100  |  23<H>  ----------------------------<  125<H>  5.0   |  22  |  1.1      Calcium: 9.1 mg/dL (09-27-23 @ 00:56)  Magnesium: 2.0 mg/dL (09-27-23 @ 00:56)  Phosphorous: 5.9 mg/dL (09-27-23 @ 00:56)    LFTs:             6.6  | 0.6  | 104      ------------------[63      ( 27 Sep 2023 00:56 )  4.3  | 0.2  | 107         Lipase:x      Amylase:x          Coags:     11.10  ----< 0.97    ( 25 Sep 2023 06:07 )     29.0        Urinalysis Basic - ( 27 Sep 2023 00:56 )    Color: x / Appearance: x / SG: x / pH: x  Gluc: 125 mg/dL / Ketone: x  / Bili: x / Urobili: x   Blood: x / Protein: x / Nitrite: x   Leuk Esterase: x / RBC: x / WBC x   Sq Epi: x / Non Sq Epi: x / Bacteria: x      IMAGING:  No post-operative imaging obtained       ACCESS/ DEVICES:  [X] Peripheral IV  [ ] Central Venous Line	[ ] R	[ ] L	[ ] IJ	[ ] Fem	[ ] SC	Placed:   [ ] Arterial Line		[ ] R	[ ] L	[ ] Fem	[ ] Rad	[ ] Ax	Placed:   [ ] PICC:					[ ] Mediport  [ ] Urinary Catheter,  Date Placed:   [ ] Chest tube: [ ] Right, [ ] Left  [ ] MERYL/Andrew Drains

## 2023-09-27 NOTE — DISCHARGE NOTE PROVIDER - HOSPITAL COURSE
62 y/o M PMHx of HTN, asthma, hx R vertebral artery dissection presenting to the ED with abdominal pain. Pt reports yesterday he had severe sharp RUQ abdominal pain rated 9/10 in severity. Pain not associated with food intake and had an insidious onset. He preciously had minor aches on/off, but never this severe. On my examination pain 4/10, achy, and radiates from RUQ to back. Pt denies nausea, vomiting, chest pain, weakness, and numbness. RUQ Ultrasound done showing distended gallbladder with inflamed gallbladder and elevated liver enzymes. CT Abd with mild dilation of CBD 0.7cm with distal CBD stone 0.4cm. on 9/25, ERCP was done now s/p sphincterotomy with biliary sludge removal. patient tolerated procedure well, pain controlled. It was discussed the patient should have a cholecystectomy to prevent future recurrences of gallbladder inflammation and other conditions like pancreatitis. patient agreeable to undergoing laparoscopic cholecystectomy and transferred to the surgery team for further care. Intraoperatively, the CVS obtained and gallbladder not found to be necrotic or gangrenous. Per GI, they wanted to keep him on antibiotics for one more week, patient has been afebrile and without leukocytosis. Post operatively, patient had retained urine and was straight-cathed after bladder scan showed 620cc. he was put on flomax to observe output and passed his trial of void in the afternoon on day of discharge. At time of discharge, patient was tolerating diet, ambulating well, passing gas, had a bowel movement, and voiding appropriately with pain well controlled. He can follow up outpatient and finish his course of antibiotics. 64 y/o M PMHx of HTN, asthma, hx R vertebral artery dissection presenting to the ED with abdominal pain. Pt reports yesterday he had severe sharp RUQ abdominal pain rated 9/10 in severity. Pain not associated with food intake and had an insidious onset. He preciously had minor aches on/off, but never this severe. On my examination pain 4/10, achy, and radiates from RUQ to back. Pt denies nausea, vomiting, chest pain, weakness, and numbness. RUQ Ultrasound done showing distended gallbladder with inflamed gallbladder and elevated liver enzymes. CT Abd with mild dilation of CBD 0.7cm with distal CBD stone 0.4cm. on 9/25, ERCP was done now s/p sphincterotomy with biliary sludge removal. patient tolerated procedure well, pain controlled. It was discussed the patient should have a cholecystectomy to prevent future recurrences of gallbladder inflammation and other conditions like pancreatitis. patient agreeable to undergoing laparoscopic cholecystectomy and transferred to the surgery team for further care. now S/P laparoscopic cholecystectomy. Intraoperatively, the CVS obtained and gallbladder not found to be necrotic or gangrenous. Per GI, they wanted to keep him on antibiotics for one more week, patient has been afebrile and without leukocytosis. Post operatively, patient had retained urine and was straight-cathed after bladder scan showed 620cc. he was put on flomax to observe output and passed his trial of void in the afternoon on day of discharge. At time of discharge, patient was tolerating diet, ambulating well, passing gas, had a bowel movement, and voiding appropriately with pain well controlled. He can follow up outpatient and finish his course of antibiotics. *NOTE INCOMPLETE...PENDING*     62 y/o M PMHx of HTN, asthma, hx R vertebral artery dissection presenting to the ED with abdominal pain. Pt reports yesterday he had severe sharp RUQ abdominal pain rated 9/10 in severity. Pain not associated with food intake and had an insidious onset. He preciously had minor aches on/off, but never this severe. On my examination pain 4/10, achy, and radiates from RUQ to back. Pt denies nausea, vomiting, chest pain, weakness, and numbness. RUQ Ultrasound done showing distended gallbladder with inflamed gallbladder and elevated liver enzymes. CT Abd with mild dilation of CBD 0.7cm with distal CBD stone 0.4cm. on 9/25, ERCP was done now s/p sphincterotomy with biliary sludge removal. patient tolerated procedure well, pain controlled. It was discussed the patient should have a cholecystectomy to prevent future recurrences of gallbladder inflammation and other conditions like pancreatitis. patient agreeable to undergoing laparoscopic cholecystectomy and transferred to the surgery team for further care. now S/P laparoscopic cholecystectomy. Intraoperatively, the CVS obtained and gallbladder not found to be necrotic or gangrenous. Per GI, they wanted to keep him on antibiotics for one more week, patient has been afebrile and without leukocytosis. Post operatively, patient had retained urine and was straight-cathed after bladder scan showed 620cc. he was put on flomax to observe output and passed his trial of void in the afternoon on day of discharge. At time of discharge, patient was tolerating diet, ambulating well, passing gas, had a bowel movement, and voiding appropriately with pain well controlled. He can follow up outpatient and finish his course of antibiotics. 62 y/o M PMHx of HTN, asthma, hx R vertebral artery dissection presenting to the ED with abdominal pain. Pt reports yesterday he had severe sharp RUQ abdominal pain rated 9/10 in severity. Pain not associated with food intake and had an insidious onset. He preciously had minor aches on/off, but never this severe. On my examination pain 4/10, achy, and radiates from RUQ to back. Pt denies nausea, vomiting, chest pain, weakness, and numbness. RUQ Ultrasound done showing distended gallbladder with inflamed gallbladder and elevated liver enzymes. CT Abd with mild dilation of CBD 0.7cm with distal CBD stone 0.4cm. on 9/25, ERCP was done now s/p sphincterotomy with biliary sludge removal. patient tolerated procedure well, pain controlled. It was discussed the patient should have a cholecystectomy to prevent future recurrences of gallbladder inflammation and other conditions like pancreatitis. patient agreeable to undergoing laparoscopic cholecystectomy and transferred to the surgery team for further care. now S/P laparoscopic cholecystectomy. Intraoperatively, the CVS obtained and gallbladder not found to be necrotic or gangrenous. Per GI, they wanted to keep him on antibiotics for one more week, patient has been afebrile and without leukocytosis. Post operatively, patient had retained urine and was straight-cathed after bladder scan showed 620cc. he was put on flomax to observe output and passed his trial of void in the afternoon on day of discharge. At time of discharge, patient was tolerating diet, ambulating well, passing gas, had a bowel movement, and voiding appropriately with pain well controlled. He can follow up outpatient and finish his course of antibiotics.

## 2023-09-27 NOTE — DISCHARGE NOTE PROVIDER - NSDCFUSCHEDAPPT_GEN_ALL_CORE_FT
Unknown, Doctor  [facility] PreAdmits  Scheduled Appointment: 10/07/2023    [facility]  DANITA GallardoC Brayan Lemus  Scheduled Appointment: 10/07/2023    [facility]  DANITA Lemus  Scheduled Appointment: 10/07/2023

## 2023-09-27 NOTE — DISCHARGE NOTE PROVIDER - DISCHARGE DATE
Patient Education     Middle Ear Infection (Adult)  You have an infection of the middle ear (the space behind the eardrum). This is also called acute otitis media (AOM). Sometimes it is caused by the common cold. This is because congestion can block the internal passage (eustachian tube) that drains fluid from the middle ear. When the middle ear fills with fluid, bacteria can grow there and cause an infection. Oral antibiotics are used to treat this illness, not ear drops. Symptoms usually start to improve within 1 to 2 days of treatment.    Home care  The following are general care guidelines:  · Finish all of the antibiotic medicine given, even though you may feel better after the first few days.  · You may use acetaminophen or ibuprofen to control pain, unless something else was prescribed. [NOTE: If you have chronic liver or kidney disease or have ever had a stomach ulcer or GI bleeding, talk with your doctor before using these medicines.] Do not give aspirin to anyone under 18 years of age who has a fever. It may cause severe liver damage.  Follow-up care  Follow up with your doctor in 2 weeks if all symptoms have not gotten better, or if hearing doesn't go back to normal within 1 month.  When to seek medical care  Get prompt medical attention if any of the following occur:  · Ear pain gets worse or does not improve after 3 days of treatment  · Unusual drowsiness or confusion  · Neck pain, stiff neck, or headache  · Fluid or blood draining from the ear canal  · Fever of 100.4°F (38°C) or higher after 3 days of antibiotics, or as directed by your health care provider  · Convulsion (seizure)  © 2950-7909 [employer]. [address] [place] [address], [place] [address]. All rights reserved. This information is not intended as a substitute for professional medical care. Always follow your healthcare professional's instructions.            27-Sep-2023

## 2023-09-27 NOTE — DISCHARGE NOTE PROVIDER - CARE PROVIDER_API CALL
Naima Navarrete  Surgery  [address] [place] [id number]  Phone: [phone]  Fax: [phone]  Follow Up Time: 2 weeks

## 2023-09-27 NOTE — DISCHARGE NOTE PROVIDER - NSDCMRMEDTOKEN_GEN_ALL_CORE_FT
aspirin 81 mg oral delayed release tablet: 1 tab(s) orally once a day  Breo Ellipta 100 mcg-25 mcg/inh inhalation powder: 1 puff(s) inhaled once a day  ciprofloxacin 500 mg oral tablet: 1 tab(s) orally 2 times a day  Lipitor 10 mg oral tablet: 1 tab(s) orally once a day  metoprolol succinate 25 mg oral tablet, extended release: 1 tab(s) orally once a day  metroNIDAZOLE 500 mg oral tablet: 1 tab(s) orally every 8 hours Do not consume alcoholic beverages while on this antibiotic  ProAir HFA 90 mcg/inh inhalation aerosol: 2 puff(s) inhaled 4 times a day, As Needed

## 2023-09-27 NOTE — PROGRESS NOTE ADULT - ASSESSMENT
62 y/o man with PMH of HTN, asthma and right vertebral artery dissection presented to the ED with RUQ pain.     1. Biliary colic  Cholelithiasis and Choledocholithiasis with dilated CBD  Transaminitis - LFTs trending down   Leukocytosis but no fever - now resolved  - Reports prior h/o ERCP 3 years ago when GI was unable to remove stone but symptoms improved  - pain now resolved per pt  - s/p ERCP with sphincterotomy on 9/25. Biliary sludge removed  - s/p lap cholecystectomy today  - continue Cipro and flagyl for 7 days    #urinary retention  600 ml , starit cath was done  will monitor   if persistent problem will consult urology   straight cath for now     # hyperkalemia  daily BMP  lokelma today   follow labs     2. Hx Right vertebral artery dissection- on Aspirin and Lipitor    3. HTN - on metoprolol - BP acceptable    4. Asthma - stable  C/w home inhalers (Breo and Albuterol PRN)    5. DVT prophylaxis   resume lovenox  OOB and ambulate      full code      PROGRESS NOTE HANDOFF    follow up: urinary retention (new), hyperkelemia, daily BMP, monitor potassium and LFTs. can be discharged in 24-48 hrs if clinically doing better and able to urinate and no urinary retention   need 7 days of total abx. 
64 y/o man with PMH of HTN, asthma and right vertebral artery dissection presented to the ED with RUQ pain.     1. Biliary colic  Cholelithiasis and Choledocholithiasis with dilated CBD  Transaminitis - LFTs now trending down - ?passed stone  Leukocytosis but no fever  - Reports prior h/o ERCP 3 years ago when GI was unable to remove stone but symptoms improved  - pain now resolved per pt  - keep NPO for now  - for ERCP today (confirmed with advanced GI)  - added Cipro and flagyl now for leukocytosis and monitor for cholangitis  - trend WBC and monitor temps  - surgery eval appreciated - for lap cholecystectomy on 9/26    2. Hx Right vertebral artery dissection- on Aspirin and Lipitor    3. HTN - on metoprolol - BP acceptable    4. Asthma - stable  C/w home inhalers (Breo and Albuterol PRN)    5. DVT prophylaxis   hold lovenox for ERCP  OOB and ambulate      full code    PROGRESS NOTE HANDOFF    Pending: ERCP today, lap jammie on 9/26, labs in AM  pt informed of the plan of care earlier today  Disposition: home  
64 y/o man with PMH of HTN, asthma and right vertebral artery dissection presented to the ED with RUQ pain.     1. Biliary colic  Cholelithiasis and Choledocholithiasis with dilated CBD  Transaminitis - LFTs trending down   Leukocytosis but no fever - now resolved  - Reports prior h/o ERCP 3 years ago when GI was unable to remove stone but symptoms improved  - pain now resolved per pt  - s/p ERCP with sphincterotomy on 9/25. Biliary sludge removed  - for lap cholecystectomy today  - NPO for procedure  - preop eval: RCRI of 0. Pt with >4 METS of activity  - low risk for intermediate risk procedure and may proceed to surgery  - continue Cipro and flagyl for 7 days    2. Hx Right vertebral artery dissection- on Aspirin and Lipitor    3. HTN - on metoprolol - BP acceptable    4. Asthma - stable  C/w home inhalers (Breo and Albuterol PRN)    5. DVT prophylaxis   resume lovenox  OOB and ambulate      full code      PROGRESS NOTE HANDOFF    Pending, lap jammie today, labs in AM  pt informed of the plan of care   Disposition: home  
Patient is a pleasant 62 y/o male with a PMHx of HTN, asthma, hx R vertebral artery dissection who presented to the ED with abdominal pain. Patient was admitted under medicine team and had an ERCP done 9/25. ERCP was done with sphincterotomy and removal of sludge. Patient tolerated procedure well without any post procedural pain. He has no melena, nausea, emesis, nor current fever.     ERCP discussed with patient and spouse at bedside this morning. Labs from 21:00 9/25 reassuring. Surgery on board and patient added on to OR today but is still deciding.     Elevated LFT/ ERCP done with sphincterotomy and sludge extraction- No stent placed   - Hemodynamically stable  - Labs reassuring post procedure  - Would continue Cipro and Flagyl for 7 days total  - Procedure discussed with patient and spouse No stent left- follow up as outpatient if needed but not mandatory   - Appreciated surgical team input  - Recall as needed 
63 year old male with a medical history of HTN, asthma, history of Right vertebral artery dissection, presented to the ED complaining with abdominal pain. Patient reports pain started on 9/23, and described as sharp , 9/10 in the RUQ. Pain not associated with food intake. Patient denied with nausea, vomiting, chest pian, weakness and numbness. Work up included US shwoing distended gallbladder with cholelithiasis, CT mild dilation of common bile duct up to 0.7 cm with distal CBD 0.4 cm choledocholith and Transaminitis. Gastroenterology planing to perform a ERCP on 9/25/23 . Likely choledocholithiasis  Laparoscopic cholecystectomy planned. Risks, benefit and complications of procedure discussed including, but not limited to: pain, bleeding, infection, injury to nearby structures. Patient verbalized and whishes to proceed.     PLAN:    -Add on case for Lap cholecystectomy, possible open on 9/26/2023  with Dr. Navarrete  - Patient hesitant about undergoing lap jammie, will discuss further with pt in AM    -Rest per primary 
63M w/ PMHx of vertebral artery aneurysm, h/o stroke, HTN, asthma who presented to ED on 9/24/23 w/ RUQ abdominal pain, found to have choledocholithiasis Now S/P ERCP w/ sphincterotomy and sludge removal (9/25) and S/P robot-assisted laparoscopic cholecystectomy (9/26).     PLAN:  - F/U post-op labs   - Continue low-fat diet   - Encourage ambulation   - Pain control   - Continue IV abx (Cipro + Flagyl) --> D/C on PO Augmentin BID for total 7 days   - Dispo planning       Surgery - Blue Team   [phone]

## 2023-10-01 LAB
SURGICAL PATHOLOGY STUDY: SIGNIFICANT CHANGE UP

## 2023-10-13 ENCOUNTER — APPOINTMENT (OUTPATIENT)
Dept: SURGERY | Facility: CLINIC | Age: 63
End: 2023-10-13
Payer: COMMERCIAL

## 2023-10-13 VITALS
DIASTOLIC BLOOD PRESSURE: 82 MMHG | BODY MASS INDEX: 23.99 KG/M2 | WEIGHT: 144 LBS | HEIGHT: 65 IN | SYSTOLIC BLOOD PRESSURE: 128 MMHG | OXYGEN SATURATION: 97 % | HEART RATE: 86 BPM | TEMPERATURE: 96.2 F

## 2023-10-13 DIAGNOSIS — K80.50 CALCULUS OF BILE DUCT W/OUT CHOLANGITIS OR CHOLECYSTITIS W/OUT OBSTRUCTION: ICD-10-CM

## 2023-10-13 PROCEDURE — 99024 POSTOP FOLLOW-UP VISIT: CPT

## 2023-10-17 ENCOUNTER — NON-APPOINTMENT (OUTPATIENT)
Age: 63
End: 2023-10-17

## 2023-10-17 PROBLEM — K80.50 CHOLEDOCHOLITHIASIS: Status: ACTIVE | Noted: 2023-10-17

## 2023-10-25 ENCOUNTER — APPOINTMENT (OUTPATIENT)
Dept: SURGERY | Facility: CLINIC | Age: 63
End: 2023-10-25

## 2024-01-17 NOTE — PATIENT PROFILE ADULT - FALL HARM RISK - ATTEMPT OOB
NURSING ADMISSION NOTE      Patient admitted via Cart  Oriented to room.  Safety precautions initiated.  Bed in low position.  Call light in reach.    Received patient from PACU per stretcher. Awake & alert,accompanied by transporter & spouse. Placed comfortably in bed. Diet advancing, clears at this time. IV fluids infusing. All vital signs stable. Wesly continue to monitor. .   No

## 2024-02-08 ENCOUNTER — APPOINTMENT (OUTPATIENT)
Dept: CARDIOLOGY | Facility: CLINIC | Age: 64
End: 2024-02-08
Payer: COMMERCIAL

## 2024-02-08 PROCEDURE — 93306 TTE W/DOPPLER COMPLETE: CPT

## 2024-03-04 NOTE — PHYSICAL EXAM
[Normal Venous Pressure] : normal venous pressure [Normal S1, S2] : normal S1, S2 [Clear Lung Fields] : clear lung fields [Soft] : abdomen soft [No Edema] : no edema [No Rash] : no rash [de-identified] : RRR

## 2024-03-04 NOTE — DISCUSSION/SUMMARY
[FreeTextEntry1] : pt syncopal etiology unknown, ? arrhythmic  get CTA head and neck carotid with vertebrals as not well visualized in last study, if still unable to visualize on u/s will get carotid neck.  predm diet control. continue atorvastatin continue metoprolol

## 2024-03-04 NOTE — HISTORY OF PRESENT ILLNESS
[FreeTextEntry1] : Pt with HTN, HLD, RIGHT SIDED WEAKNESS FOR 3 MONTHS  AFTER VERTEBRAL ARTERY DISSECTION  S/P COVERED STENT, PREDIABETES, ASTHMA, LOW HDL 34 RANGE, borderline LAE, SYNCOPE IN .   : a1C: 5.9 HDL 34   3/21/23:  pt had a syncopal episode after using an inhaler and was out for 15 seconds. pt woke and not foggy and clear, saw pmd and sent for testing.  echo outside center: normal lvef 55%  carotid less than 50% b/l ica  pt has had no prior syncopal episodes. PT SYMPTOMS WITH DISSECTION WAS FALLING TO RIGHT SIDE NO LOSS OF CONSCIOUSNESS.  bloodwork normal  A1c: 5.9  LDL 76, HDL 43.   /: A1C: 5.9, T, HDL: 41, LDL: 74, CR: 1.3 3/31/23: B/L CAROTIDS LESS THAN 50% B/L ICA, vertebral arteries b/l antegrade flow. Patient c/o left sided head pain hx of vertebral artery dissection. Patient denies Patient denies cp, sob, dizziness, syncope, or palpitations. Patient does not exercise says he walks a few blocks and does not feel sob. Patient was in [facility]- and did not have any issues.   3/5/24:  : lvef 66%, borderline LAE, mild AR, Lvot vti: 18.5 cm, Hr: 53 bpm  CTA neck to visualize carotids and vertebral stent not done  bloodwork

## 2024-03-05 ENCOUNTER — APPOINTMENT (OUTPATIENT)
Dept: CARDIOLOGY | Facility: CLINIC | Age: 64
End: 2024-03-05
Payer: COMMERCIAL

## 2024-03-05 VITALS — DIASTOLIC BLOOD PRESSURE: 80 MMHG | SYSTOLIC BLOOD PRESSURE: 130 MMHG

## 2024-03-05 VITALS — HEIGHT: 65 IN | WEIGHT: 147 LBS | BODY MASS INDEX: 24.49 KG/M2

## 2024-03-05 DIAGNOSIS — I77.74 DISSECTION OF VERTEBRAL ARTERY: ICD-10-CM

## 2024-03-05 DIAGNOSIS — R73.03 PREDIABETES.: ICD-10-CM

## 2024-03-05 DIAGNOSIS — I65.23 OCCLUSION AND STENOSIS OF BILATERAL CAROTID ARTERIES: ICD-10-CM

## 2024-03-05 DIAGNOSIS — I67.82 CEREBRAL ISCHEMIA: ICD-10-CM

## 2024-03-05 DIAGNOSIS — I10 ESSENTIAL (PRIMARY) HYPERTENSION: ICD-10-CM

## 2024-03-05 DIAGNOSIS — E78.2 MIXED HYPERLIPIDEMIA: ICD-10-CM

## 2024-03-05 DIAGNOSIS — R55 SYNCOPE AND COLLAPSE: ICD-10-CM

## 2024-03-05 PROCEDURE — 99214 OFFICE O/P EST MOD 30 MIN: CPT

## 2024-03-05 RX ORDER — ATORVASTATIN CALCIUM 20 MG/1
20 TABLET, FILM COATED ORAL
Qty: 90 | Refills: 3 | Status: ACTIVE | COMMUNITY
Start: 1900-01-01 | End: 1900-01-01

## 2024-03-05 RX ORDER — EMPAGLIFLOZIN 10 MG/1
10 TABLET, FILM COATED ORAL DAILY
Qty: 90 | Refills: 3 | Status: ACTIVE | COMMUNITY
Start: 2024-03-05 | End: 1900-01-01

## 2024-03-11 ENCOUNTER — APPOINTMENT (OUTPATIENT)
Dept: CARDIOLOGY | Facility: CLINIC | Age: 64
End: 2024-03-11
Payer: COMMERCIAL

## 2024-03-11 PROCEDURE — 93880 EXTRACRANIAL BILAT STUDY: CPT

## 2024-03-18 NOTE — PHYSICAL EXAM
[Normal Venous Pressure] : normal venous pressure [Normal S1, S2] : normal S1, S2 [Soft] : abdomen soft [Clear Lung Fields] : clear lung fields [No Edema] : no edema [No Rash] : no rash [de-identified] : RRR

## 2024-03-18 NOTE — HISTORY OF PRESENT ILLNESS
[FreeTextEntry1] : Pt with HTN, HLD, RIGHT SIDED WEAKNESS FOR 3 MONTHS  AFTER VERTEBRAL ARTERY DISSECTION  S/P COVERED STENT, PREDIABETES, ASTHMA, LOW HDL 34 RANGE, borderline LAE, SYNCOPE IN .   : a1C: 5.9 HDL 34   3/21/23:  pt had a syncopal episode after using an inhaler and was out for 15 seconds. pt woke and not foggy and clear, saw pmd and sent for testing.  echo outside center: normal lvef 55%  carotid less than 50% b/l ica  pt has had no prior syncopal episodes. PT SYMPTOMS WITH DISSECTION WAS FALLING TO RIGHT SIDE NO LOSS OF CONSCIOUSNESS.  bloodwork normal  A1c: 5.9  LDL 76, HDL 43.   /: A1C: 5.9, T, HDL: 41, LDL: 74, CR: 1.3 3/31/23: B/L CAROTIDS LESS THAN 50% B/L ICA, vertebral arteries b/l antegrade flow. Patient c/o left sided head pain hx of vertebral artery dissection. Patient denies Patient denies cp, sob, dizziness, syncope, or palpitations. Patient does not exercise says he walks a few blocks and does not feel sob. Patient was [place] [facility] [place]- and did not have any issues.   3/5/24:  : lvef 66%, borderline LAE, mild AR, Lvot vti: 18.5 cm, Hr: 53 bpm  CTA neck to visualize carotids and vertebral stent not done  3/24: GFR: 69 LDL 81 HDL 47 A1c: 6 worsened BNP: 36  pt was in hospital with cholecystecotomy as [facility] in  with no issues. pt not exercising much, pt walks around with no issues.   24: 3/11/24: Carotid: Right: proximal ICA <50% stenosis, vessel tortuous. Left: proximal ICA <50% stenosis, vessel tortuous. Vertebral arteries: antegrade flow bilaterally. Subclavian arteries: no significant atherosclerosis bilaterally. Vertebral stent not visualized.

## 2024-03-18 NOTE — DISCUSSION/SUMMARY
[FreeTextEntry1] : pt syncopal etiology unknown, ? arrhythmic  get CTA head and neck carotid with vertebral as not well visualized in last study, if still unable to visualize on repeat u/s  predm diet control will add jardiance 10 mg po qd  increase atorvastatin to 20 mg  continue metoprolol  get carotid/ cta head and neck look for vertebral artery stent

## 2024-07-09 ENCOUNTER — APPOINTMENT (OUTPATIENT)
Dept: CARDIOLOGY | Facility: CLINIC | Age: 64
End: 2024-07-09

## 2024-07-09 DIAGNOSIS — I77.74 DISSECTION OF VERTEBRAL ARTERY: ICD-10-CM

## 2024-07-09 DIAGNOSIS — E78.2 MIXED HYPERLIPIDEMIA: ICD-10-CM

## 2024-07-09 DIAGNOSIS — I65.23 OCCLUSION AND STENOSIS OF BILATERAL CAROTID ARTERIES: ICD-10-CM

## 2024-07-09 DIAGNOSIS — I10 ESSENTIAL (PRIMARY) HYPERTENSION: ICD-10-CM

## 2024-07-09 DIAGNOSIS — Z00.00 ENCOUNTER FOR GENERAL ADULT MEDICAL EXAMINATION W/OUT ABNORMAL FINDINGS: ICD-10-CM

## 2024-07-09 DIAGNOSIS — R55 SYNCOPE AND COLLAPSE: ICD-10-CM

## 2024-07-09 DIAGNOSIS — I67.82 CEREBRAL ISCHEMIA: ICD-10-CM

## 2024-07-09 DIAGNOSIS — R73.03 PREDIABETES.: ICD-10-CM

## 2025-04-24 NOTE — DISCHARGE NOTE PROVIDER - NSDCQMERRANDS_GEN_ALL_CORE
Lab work was reviewed showing his total cholesterol is near 200.  His .    He is on atorvastatin 40 mg nightly.    He reports that he is tolerating this dose.    We discussed increasing his statin or adding a Zetia or lifestyle modifications.    Patient reports that he would like to try lifestyle modifications.  We discussed increasing exercise, increasing fiber and decreasing cholesterol from animal sources.  Patient verbalized understanding    We will continue to follow cholesterol annually and at this time will continue atorvastatin 40 mg nightly.   No